# Patient Record
Sex: MALE | Race: BLACK OR AFRICAN AMERICAN | NOT HISPANIC OR LATINO | Employment: FULL TIME | URBAN - METROPOLITAN AREA
[De-identification: names, ages, dates, MRNs, and addresses within clinical notes are randomized per-mention and may not be internally consistent; named-entity substitution may affect disease eponyms.]

---

## 2018-06-05 ENCOUNTER — OFFICE VISIT (OUTPATIENT)
Dept: NEUROLOGY | Facility: CLINIC | Age: 18
End: 2018-06-05
Payer: COMMERCIAL

## 2018-06-05 VITALS
BODY MASS INDEX: 29.98 KG/M2 | WEIGHT: 191 LBS | HEIGHT: 67 IN | HEART RATE: 64 BPM | DIASTOLIC BLOOD PRESSURE: 54 MMHG | SYSTOLIC BLOOD PRESSURE: 112 MMHG

## 2018-06-05 DIAGNOSIS — G40.409 OTHER GENERALIZED EPILEPSY, NOT INTRACTABLE, WITHOUT STATUS EPILEPTICUS: Primary | ICD-10-CM

## 2018-06-05 PROCEDURE — 3008F BODY MASS INDEX DOCD: CPT | Mod: CPTII,S$GLB,, | Performed by: PSYCHIATRY & NEUROLOGY

## 2018-06-05 PROCEDURE — 99999 PR PBB SHADOW E&M-NEW PATIENT-LVL III: CPT | Mod: PBBFAC,,, | Performed by: PSYCHIATRY & NEUROLOGY

## 2018-06-05 PROCEDURE — 99204 OFFICE O/P NEW MOD 45 MIN: CPT | Mod: S$GLB,,, | Performed by: PSYCHIATRY & NEUROLOGY

## 2018-06-05 RX ORDER — DIVALPROEX SODIUM 500 MG/1
500 TABLET, FILM COATED, EXTENDED RELEASE ORAL 2 TIMES DAILY
COMMUNITY
End: 2018-07-10 | Stop reason: SDUPTHER

## 2018-06-05 NOTE — PROGRESS NOTES
[facility] - NEUROLOGY EPILEPSY  [facility] [place]    Date: 6/5/18  Patient Name: Teodora Sanchez   MRN: [id number]   PCP: Verna Slater  Referring Provider: Self, Aaareferral    Assessment:      This is Teodora Sanchez, 17 y.o. male who presents to establish care for a longstanding seizure disorder.  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  Patient has known history of abnormal EEG with 3 hertz spike and wave however reports an ambulatory EEG completed last year with over 100 reported seizures per night (report not available today).  Will repeat ambulatory EMG to assess seizure burden pending results with this as well as trial of good compliance on Depakote, may consider AED changes.  Patient will also undergo baseline seizure protocoled MRI brain.  Will defer Depakote level until patient is compliant with medication.  Plan:      Problem List Items Addressed This Visit        Neuro    Epilepsy - Primary    Relevant Orders    Ambulatory EEG monitoring    MRI Brain Epilepsy Without Contrast           I completed education on seizure first aid and safety. I recommended seizure precautions with regards to avoiding unsupervised water recreational activity or bathing in tubs, climbing or working at heights, operation of heavy or dangerous machinery, caution around fire and sources of high heat, as well as any other activity which could put a patient at danger in case of a seizure.  I also reviewed the [place] DMV law and recommended that the patient not drive until seizure free for a minimum of 6 months.    Charles Woodruff MD  [facility]   Department of Neurology    Patient note was created using Dragon Dictation.  Any errors in syntax or even information may not have been identified and edited on initial review prior to signing this note.  Subjective:       HPI:   Mr. Teodora Sanchez is a 17 y.o. male who presents with a chief complaint of long-standing epilepsy.  Patient  presents today with his mother who contributes to the history.  Together they report that the patient began having seizures around the age of 9 or 10 when he was in 5th to 6th grade.  He initially present absence type seizures described as brief staring spells with rapid return to baseline.  These were followed several years later by onset of generalized tonic-clonic seizures.  He has previously been maintained on Zarontin and Lamictal and most recently has been managed on Depakote but admits that as a new college student, he has had several recent provoke seizures in the setting of alcohol use and sleep deprivation.  His mother states that several years ago he underwent an ambulatory EEG performed by a neurologist in [place] and was told that he had 100s of seizures in his sleep.  The patient does have record of an EEG on file at all sure which showed 3 hertz spike and wave in 2013.  He has never undergone any form of brain imaging that he or his mother are aware of.    Seizure Type: Primary generalized  Seizure Etiology: Unknown  Current AEDs: Depakote 500 mg BID    The patient is accompanied by family who contribute to the history. This patient has 2 types of seizure as described below. The patient reports having seizures for years. The patient reports to have stable seizure control. The seizure frequency is approximately monthly. The last seizure was 6/3/18 . The patient reports no side effects from seizure medication.     Seizure Type 1:   Seizure Description: Blank stare for a few minutes   Aura: No warning  Associated Symptoms: None  Seizure Frequency: Daily  Last seizure: 8th grade    Seizure Type 2:   Seizure Description: hands curl in, jerks all over, eyes flutter, postictal confusions  Aura: None  Associated Symptoms: Bites tongue  Seizure Frequency: Monthly  Last seizure: 6/2/18- had two, came out, started again    Handedness: Right handed  Seizure Triggers/ Provoking Features: alcohol  consumption,  "sleep deprivation, stress and missed medications   Seizure Onset Age: ~5th-6th grade  Seizure/ Epilepsy Risk Factors: Childhood epilepsy, mother with childhood epilepsy  Birth/Developmental History: Was in NICU "tailbone wasn't connected." Normal developmental history.   Previous Seizure Medications: VPA, ZNS, LTG  Other Treatments: None  Episodes of Status: ~Age 13 (Cluster of 8 sz, needed repeated IV meds)  Recent Med Changes: None  Psychiatric/Behavioral Comorbitidies: None  Surgical Candidacy: N/A    Prior Studies:  EEG : 2013- 3 Hz spike and wave   vEEG/ EMU evaluation: Not done  MRI of brain: Not done  Other studies:Not done  AED levels: Not done  CT/CTA Scan: Not done  PET Scan:Not done  Neuropsychological Evaluation: Not done  DEXA Scan: Not done    PAST MEDICAL HISTORY:  Past Medical History:   Diagnosis Date    Asthma     Seizures        PAST SURGICAL HISTORY:  Past Surgical History:   Procedure Laterality Date    CYST REMOVAL      leg     CURRENT MEDS:  Current Outpatient Prescriptions   Medication Sig Dispense Refill    divalproex ER (DEPAKOTE) 500 MG Tb24 Take 500 mg by mouth 2 (two) times daily.      albuterol (ACCUNEB) 1.25 mg/3 mL Nebu Take 1.25 mg by nebulization every 6 (six) hours as needed.       No current facility-administered medications for this visit.      ALLERGIES:  Review of patient's allergies indicates:  No Known Allergies    FAMILY HISTORY:  No family history on file.    SOCIAL HISTORY:  Social History   Substance Use Topics    Smoking status: Never Smoker    Smokeless tobacco: Not on file    Alcohol use No       Review of Systems:  12 review of systems is negative except for the symptoms mentioned in HPI.        Objective:     Vitals:    06/05/18 1612   BP: (!) 112/54   Pulse: 64   Weight: 86.6 kg (191 lb)   Height: 5' 6.5" (1.689 m)       General: NAD, well nourished   Eyes: no tearing, discharge, no erythema   ENT: moist mucous membranes of the oral cavity, nares patent  "   Neck: Supple, Full range of motion  Cardiovascular: Warm and well perfused, pulses equal and symmetrical  Lungs: Normal work of breathing, normal chest wall excursions  Skin: No rash, lesions, or breakdown on exposed skin  Psychiatry: Mood and affect are appropriate   Abdomen: soft, non tender, non distended  Extremeties: No cyanosis, clubbing or edema.    Neurological   MENTAL STATUS: Alert and oriented to person, place, and time. Attention and concentration within normal limits. Speech without dysarthria, able to name and repeat without difficulty. Recent and remote memory within normal limits   CRANIAL NERVES: Visual fields intact. PERRL. EOMI. Facial sensation intact. Face symmetrical. Hearing grossly intact. Full shoulder shrug bilaterally. Tongue protrudes midline   SENSORY: Sensation is intact to light touch throughout.  MOTOR: Normal bulk and tone.   5/5 deltoid, biceps, triceps, interosseous, hand  bilaterally. 5/5 iliopsoas, knee extension/flexion, foot dorsi/plantarflexion bilaterally.    REFLEXES: Symmetric and 2+ throughout.   CEREBELLAR/COORDINATION/GAIT: Gait steady with normal arm swing and stride length.Finger to nose intact. Normal rapid alternating movements.

## 2018-06-06 NOTE — PATIENT INSTRUCTIONS
First Aid: Seizures  A seizure results from a sudden rush of abnormal electrical signals in the brain. Symptoms may range from a minor daze to uncontrollable muscle spasms (convulsions). In many cases, the victim will lose consciousness. A seizure can be caused by a high fever, head injury, drug reaction, or condition such as epilepsy.  1. Protect the head  · Help the victim to the floor if he or she begins losing muscle control. Turn the person on his or her side to prevent choking.  · Protect the victim's head from injury by placing something soft, such as folded clothes, beneath it, and by moving objects away from the victim.  · Don't cause injury by restraining the person or by placing anything in his or her mouth.  · Remove eyeglasses.  2.  Preserve dignity  · Clear away bystanders.  · Reassure the victim, who may be confused, drowsy, or hostile when coming out of the seizure.  · Cover the person or provide dry clothes if muscle spasms have caused a loss of bladder control.  3. Check for injury  · Make sure the victim's mental state has returned to normal. One way to do this is to ask the person his or her name, the year, and your location.  · Injuries can occur to the head, mouth, tongue, or body.   4. Call 911  · If the seizure lasts longer than five minutes (Note: Timing the seizure and recovery time is helpful in many cases.)  · If a second seizure occurs  · If the victim doesnt regain consciousness  · If the victim is pregnant  · If the victim has no history of seizures  · If the person has sustained an injury during the seizure. (Note: If the injury is not severe or life threatening, it may be more appropriate to seek treatment with the primary care provider.)  Date Last Reviewed: 10/19/2015  © 2192-4222 [employer]. [address] [place] [address], [place] [address]. All rights reserved. This information is not intended as a substitute for professional medical care. Always follow your healthcare  professional's instructions.

## 2018-06-11 ENCOUNTER — HOSPITAL ENCOUNTER (OUTPATIENT)
Dept: RADIOLOGY | Facility: HOSPITAL | Age: 18
Discharge: HOME | End: 2018-06-11
Attending: PSYCHIATRY & NEUROLOGY
Payer: COMMERCIAL

## 2018-06-11 DIAGNOSIS — G40.409 OTHER GENERALIZED EPILEPSY, NOT INTRACTABLE, WITHOUT STATUS EPILEPTICUS: ICD-10-CM

## 2018-06-11 PROCEDURE — 70551 MRI BRAIN STEM W/O DYE: CPT | Mod: TC

## 2018-06-11 PROCEDURE — 70551 MRI BRAIN STEM W/O DYE: CPT | Mod: 26,,, | Performed by: RADIOLOGY

## 2018-06-12 ENCOUNTER — HOSPITAL ENCOUNTER (OUTPATIENT)
Dept: NEUROLOGY | Facility: CLINIC | Age: 18
Discharge: HOME | End: 2018-06-12
Payer: COMMERCIAL

## 2018-06-12 DIAGNOSIS — G40.409 OTHER GENERALIZED EPILEPSY, NOT INTRACTABLE, WITHOUT STATUS EPILEPTICUS: ICD-10-CM

## 2018-06-12 PROCEDURE — 95953 PR EEG MONITORING/COMPUTER, EA 24 HOURS, UNATTENDED: CPT | Mod: S$GLB,,, | Performed by: PSYCHIATRY & NEUROLOGY

## 2018-06-12 PROCEDURE — 95953 PR EEG MONITORING/COMPUTER, EA 24 HOURS, UNATTENDED: ICD-10-PCS | Mod: S$GLB,,, | Performed by: PSYCHIATRY & NEUROLOGY

## 2018-06-13 PROCEDURE — 95953 PR EEG MONITORING/COMPUTER, EA 24 HOURS, UNATTENDED: CPT | Mod: S$GLB,,, | Performed by: PSYCHIATRY & NEUROLOGY

## 2018-06-13 PROCEDURE — 95953 PR EEG MONITORING/COMPUTER, EA 24 HOURS, UNATTENDED: ICD-10-PCS | Mod: S$GLB,,, | Performed by: PSYCHIATRY & NEUROLOGY

## 2018-06-14 ENCOUNTER — HOSPITAL ENCOUNTER (OUTPATIENT)
Dept: NEUROLOGY | Facility: CLINIC | Age: 18
Discharge: HOME | End: 2018-06-14
Payer: COMMERCIAL

## 2018-07-10 ENCOUNTER — LAB VISIT (OUTPATIENT)
Dept: LAB | Facility: HOSPITAL | Age: 18
End: 2018-07-10
Attending: PSYCHIATRY & NEUROLOGY
Payer: COMMERCIAL

## 2018-07-10 ENCOUNTER — OFFICE VISIT (OUTPATIENT)
Dept: NEUROLOGY | Facility: CLINIC | Age: 18
End: 2018-07-10
Payer: COMMERCIAL

## 2018-07-10 VITALS — HEIGHT: 66 IN | BODY MASS INDEX: 30.7 KG/M2 | WEIGHT: 191 LBS

## 2018-07-10 DIAGNOSIS — Z51.81 THERAPEUTIC DRUG MONITORING: ICD-10-CM

## 2018-07-10 DIAGNOSIS — Z51.81 THERAPEUTIC DRUG MONITORING: Primary | ICD-10-CM

## 2018-07-10 LAB — VALPROATE SERPL-MCNC: 80.9 UG/ML

## 2018-07-10 PROCEDURE — 80164 ASSAY DIPROPYLACETIC ACD TOT: CPT

## 2018-07-10 PROCEDURE — 3008F BODY MASS INDEX DOCD: CPT | Mod: CPTII,S$GLB,, | Performed by: PSYCHIATRY & NEUROLOGY

## 2018-07-10 PROCEDURE — 36415 COLL VENOUS BLD VENIPUNCTURE: CPT

## 2018-07-10 PROCEDURE — 99214 OFFICE O/P EST MOD 30 MIN: CPT | Mod: S$GLB,,, | Performed by: PSYCHIATRY & NEUROLOGY

## 2018-07-10 PROCEDURE — 99999 PR PBB SHADOW E&M-EST. PATIENT-LVL III: CPT | Mod: PBBFAC,,, | Performed by: PSYCHIATRY & NEUROLOGY

## 2018-07-10 RX ORDER — DIVALPROEX SODIUM 500 MG/1
500 TABLET, FILM COATED, EXTENDED RELEASE ORAL 2 TIMES DAILY
Qty: 180 TABLET | Refills: 3 | Status: SHIPPED | OUTPATIENT
Start: 2018-07-10 | End: 2018-09-07

## 2018-07-10 NOTE — PATIENT INSTRUCTIONS
First Aid: Seizures  A seizure results from a sudden rush of abnormal electrical signals in the brain. Symptoms may range from a minor daze to uncontrollable muscle spasms (convulsions). In many cases, the victim will lose consciousness. A seizure can be caused by a high fever, head injury, drug reaction, or condition such as epilepsy.  1. Protect the head  · Help the victim to the floor if he or she begins losing muscle control. Turn the person on his or her side to prevent choking.  · Protect the victim's head from injury by placing something soft, such as folded clothes, beneath it, and by moving objects away from the victim.  · Don't cause injury by restraining the person or by placing anything in his or her mouth.  · Remove eyeglasses.  2.  Preserve dignity  · Clear away bystanders.  · Reassure the victim, who may be confused, drowsy, or hostile when coming out of the seizure.  · Cover the person or provide dry clothes if muscle spasms have caused a loss of bladder control.  3. Check for injury  · Make sure the victim's mental state has returned to normal. One way to do this is to ask the person his or her name, the year, and your location.  · Injuries can occur to the head, mouth, tongue, or body.   4. Call 911  · If the seizure lasts longer than five minutes (Note: Timing the seizure and recovery time is helpful in many cases.)  · If a second seizure occurs  · If the victim doesnt regain consciousness  · If the victim is pregnant  · If the victim has no history of seizures  · If the person has sustained an injury during the seizure. (Note: If the injury is not severe or life threatening, it may be more appropriate to seek treatment with the primary care provider.)  Date Last Reviewed: 10/19/2015  © 0524-2776 [employer]. [address] [place] [address], [place] [address]. All rights reserved. This information is not intended as a substitute for professional medical care. Always follow your healthcare  professional's instructions.

## 2018-07-10 NOTE — PROGRESS NOTES
[facility] - NEUROLOGY EPILEPSY  [facility] [place]    Date: 7/10/18  Patient Name: Teodora Sanchez   MRN: [id number]   PCP: Verna Slater  Referring Provider: No ref. provider found    Assessment:      This is Teodora Sanchez, 17 y.o. male who presents to establish care for a longstanding seizure disorder.  The patient is now compliant with medication with no breakthrough seizures since becoming complaint. Will obtain baseline VPA level today. Have reviewed MRI brain and Ambulatory EEG as discussed below.  Plan:      Problem List Items Addressed This Visit     None      Visit Diagnoses     Therapeutic drug monitoring    -  Primary    Relevant Orders    Valproic Acid           I completed education on seizure first aid and safety. I recommended seizure precautions with regards to avoiding unsupervised water recreational activity or bathing in tubs, climbing or working at heights, operation of heavy or dangerous machinery, caution around fire and sources of high heat, as well as any other activity which could put a patient at danger in case of a seizure.  I also reviewed the [place] DMV law and recommended that the patient not drive until seizure free for a minimum of 6 months.    Charles Woodruff MD  [facility]   Department of Neurology    Patient note was created using Dragon Dictation.  Any errors in syntax or even information may not have been identified and edited on initial review prior to signing this note.  Subjective:       HPI:   Mr. Teodora Sanchez is a 17 y.o. male who presents in follow up for long-standing epilepsy. The patient reports that he has been compliant with his depakote since his last visit and reports only a single breakthrough seizure recorded during his ambulatory EEG which also revealed 3 Hz spike/wave discharges. 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 He denies side effects and has no new complaints.     Seizure  "Type: Primary generalized  Seizure Etiology: Unknown  Current AEDs: Depakote 500 mg BID    The patient is accompanied by family who contribute to the history. This patient has 2 types of seizure as described below. The patient reports having seizures for years. The patient reports to have stable seizure control. The seizure frequency is approximately monthly. The last seizure was 6/12/18 . The patient reports no side effects from seizure medication.     Seizure Type 1:   Seizure Description: Blank stare for a few minutes   Aura: No warning  Associated Symptoms: None  Seizure Frequency: Daily  Last seizure: 8th grade    Seizure Type 2:   Seizure Description: hands curl in, jerks all over, eyes flutter, postictal confusions  Aura: None  Associated Symptoms: Bites tongue  Seizure Frequency: Monthly  Last seizure: 6/12/18    Handedness: Right handed  Seizure Triggers/ Provoking Features: alcohol  consumption, sleep deprivation, stress and missed medications   Seizure Onset Age: ~5th-6th grade  Seizure/ Epilepsy Risk Factors: Childhood epilepsy, mother with childhood epilepsy  Birth/Developmental History: Was in NICU "tailbone wasn't connected." Normal developmental history.   Previous Seizure Medications: VPA, ZNS, LTG  Other Treatments: None  Episodes of Status: ~Age 13 (Cluster of 8 sz, needed repeated IV meds)  Recent Med Changes: None  Psychiatric/Behavioral Comorbitidies: None  Surgical Candidacy: N/A    Prior Studies:  EEG : 2013- 3 Hz spike and wave, 6/2018- Ambulatory Same with one generalized sz captured  vEEG/ EMU evaluation: Not done  MRI of brain: L temporal MTS  Other studies:Not done  AED levels: Not done  CT/CTA Scan: Not done  PET Scan:Not done  Neuropsychological Evaluation: Not done  DEXA Scan: Not done    PAST MEDICAL HISTORY:  Past Medical History:   Diagnosis Date    Asthma     Seizures        PAST SURGICAL HISTORY:  Past Surgical History:   Procedure Laterality Date    CYST REMOVAL      leg " "    CURRENT MEDS:  Current Outpatient Prescriptions   Medication Sig Dispense Refill    albuterol (ACCUNEB) 1.25 mg/3 mL Nebu Take 1.25 mg by nebulization every 6 (six) hours as needed.      divalproex ER (DEPAKOTE) 500 MG Tb24 Take 1 tablet (500 mg total) by mouth 2 (two) times daily. 180 tablet 3     No current facility-administered medications for this visit.      ALLERGIES:  Review of patient's allergies indicates:  No Known Allergies    FAMILY HISTORY:  History reviewed. No pertinent family history.    SOCIAL HISTORY:  Social History   Substance Use Topics    Smoking status: Never Smoker    Smokeless tobacco: Not on file    Alcohol use No       Review of Systems:  12 review of systems is negative except for the symptoms mentioned in HPI.        Objective:     Vitals:    07/10/18 1055   Weight: 86.6 kg (191 lb)   Height: 5' 6" (1.676 m)       General: NAD, well nourished   Eyes: no tearing, discharge, no erythema   ENT: moist mucous membranes of the oral cavity, nares patent    Neck: Supple, Full range of motion  Cardiovascular: Warm and well perfused, pulses equal and symmetrical  Lungs: Normal work of breathing, normal chest wall excursions  Skin: No rash, lesions, or breakdown on exposed skin  Psychiatry: Mood and affect are appropriate   Abdomen: soft, non tender, non distended  Extremeties: No cyanosis, clubbing or edema.    Neurological   MENTAL STATUS: Alert and oriented to person, place, and time. Attention and concentration within normal limits. Speech without dysarthria, able to name and repeat without difficulty. Recent and remote memory within normal limits   CRANIAL NERVES: Visual fields intact. PERRL. EOMI. Facial sensation intact. Face symmetrical. Hearing grossly intact. Full shoulder shrug bilaterally. Tongue protrudes midline   SENSORY: Sensation is intact to light touch throughout.  MOTOR: Normal bulk and tone.   5/5 deltoid, biceps, triceps, interosseous, hand  bilaterally. 5/5 " iliopsoas, knee extension/flexion, foot dorsi/plantarflexion bilaterally.    REFLEXES: Symmetric and 2+ throughout.   CEREBELLAR/COORDINATION/GAIT: Gait steady with normal arm swing and stride length.Finger to nose intact.

## 2018-07-25 ENCOUNTER — LAB VISIT (OUTPATIENT)
Dept: LAB | Facility: HOSPITAL | Age: 18
End: 2018-07-25
Attending: PSYCHIATRY & NEUROLOGY
Payer: COMMERCIAL

## 2018-07-25 ENCOUNTER — TELEPHONE (OUTPATIENT)
Dept: NEUROLOGY | Facility: CLINIC | Age: 18
End: 2018-07-25

## 2018-07-25 DIAGNOSIS — G40.919 BREAKTHROUGH SEIZURE: Primary | ICD-10-CM

## 2018-07-25 DIAGNOSIS — Z00.00 PREVENTATIVE HEALTH CARE: ICD-10-CM

## 2018-07-25 DIAGNOSIS — G40.919 BREAKTHROUGH SEIZURE: ICD-10-CM

## 2018-07-25 LAB — VALPROATE SERPL-MCNC: 52.5 UG/ML

## 2018-07-25 PROCEDURE — 80164 ASSAY DIPROPYLACETIC ACD TOT: CPT

## 2018-07-25 PROCEDURE — 36415 COLL VENOUS BLD VENIPUNCTURE: CPT

## 2018-07-25 NOTE — TELEPHONE ENCOUNTER
"Returned call to pt's mother who states Dr. Woodruff wanted to know if Teodora had had any seizures.  Pt had one last night but never gained consciousness before falling asleep, I am unsure of when it started.  "it scared me to death".  He is complaining of a headache and shoulder pain today. Had him checked today and has no injury.  Let her know I will notify Dr. Woodruff   "

## 2018-08-08 ENCOUNTER — HOSPITAL ENCOUNTER (EMERGENCY)
Facility: HOSPITAL | Age: 18
Discharge: HOME | End: 2018-08-08
Attending: EMERGENCY MEDICINE
Payer: COMMERCIAL

## 2018-08-08 VITALS
HEART RATE: 66 BPM | SYSTOLIC BLOOD PRESSURE: 112 MMHG | BODY MASS INDEX: 29.82 KG/M2 | OXYGEN SATURATION: 99 % | RESPIRATION RATE: 16 BRPM | WEIGHT: 190 LBS | TEMPERATURE: 98 F | DIASTOLIC BLOOD PRESSURE: 56 MMHG | HEIGHT: 67 IN

## 2018-08-08 DIAGNOSIS — R56.9 SEIZURE: Primary | ICD-10-CM

## 2018-08-08 LAB
ALBUMIN SERPL BCP-MCNC: 4.1 G/DL
ALP SERPL-CCNC: 86 U/L
ALT SERPL W/O P-5'-P-CCNC: 27 U/L
ANION GAP SERPL CALC-SCNC: 12 MMOL/L
AST SERPL-CCNC: 23 U/L
BACTERIA #/AREA URNS HPF: ABNORMAL /HPF
BASOPHILS # BLD AUTO: 0.01 K/UL
BASOPHILS NFR BLD: 0.2 %
BILIRUB SERPL-MCNC: 0.4 MG/DL
BILIRUB UR QL STRIP: NEGATIVE
BUN SERPL-MCNC: 10 MG/DL
CALCIUM SERPL-MCNC: 9.8 MG/DL
CHLORIDE SERPL-SCNC: 103 MMOL/L
CLARITY UR: CLEAR
CO2 SERPL-SCNC: 25 MMOL/L
COLOR UR: YELLOW
CREAT SERPL-MCNC: 0.9 MG/DL
DIFFERENTIAL METHOD: ABNORMAL
EOSINOPHIL # BLD AUTO: 0 K/UL
EOSINOPHIL NFR BLD: 0.7 %
ERYTHROCYTE [DISTWIDTH] IN BLOOD BY AUTOMATED COUNT: 12.6 %
EST. GFR  (AFRICAN AMERICAN): >60 ML/MIN/1.73 M^2
EST. GFR  (NON AFRICAN AMERICAN): >60 ML/MIN/1.73 M^2
GLUCOSE SERPL-MCNC: 94 MG/DL
GLUCOSE UR QL STRIP: NEGATIVE
HCT VFR BLD AUTO: 37.7 %
HGB BLD-MCNC: 13.1 G/DL
HGB UR QL STRIP: NEGATIVE
HYALINE CASTS #/AREA URNS LPF: 5 /LPF
KETONES UR QL STRIP: NEGATIVE
LEUKOCYTE ESTERASE UR QL STRIP: NEGATIVE
LYMPHOCYTES # BLD AUTO: 1.7 K/UL
LYMPHOCYTES NFR BLD: 32.3 %
MCH RBC QN AUTO: 30 PG
MCHC RBC AUTO-ENTMCNC: 34.7 G/DL
MCV RBC AUTO: 87 FL
MICROSCOPIC COMMENT: ABNORMAL
MONOCYTES # BLD AUTO: 0.6 K/UL
MONOCYTES NFR BLD: 11.4 %
NEUTROPHILS # BLD AUTO: 3 K/UL
NEUTROPHILS NFR BLD: 55.4 %
NITRITE UR QL STRIP: NEGATIVE
PH UR STRIP: 5 [PH] (ref 5–8)
PLATELET # BLD AUTO: 238 K/UL
PMV BLD AUTO: 10.5 FL
POTASSIUM SERPL-SCNC: 4.2 MMOL/L
PROT SERPL-MCNC: 8 G/DL
PROT UR QL STRIP: ABNORMAL
RBC # BLD AUTO: 4.36 M/UL
RBC #/AREA URNS HPF: 1 /HPF (ref 0–4)
SODIUM SERPL-SCNC: 140 MMOL/L
SP GR UR STRIP: 1.02 (ref 1–1.03)
SQUAMOUS #/AREA URNS HPF: 2 /HPF
URN SPEC COLLECT METH UR: ABNORMAL
UROBILINOGEN UR STRIP-ACNC: NEGATIVE EU/DL
VALPROATE SERPL-MCNC: 28.2 UG/ML
WBC # BLD AUTO: 5.36 K/UL
WBC #/AREA URNS HPF: 1 /HPF (ref 0–5)

## 2018-08-08 PROCEDURE — 85025 COMPLETE CBC W/AUTO DIFF WBC: CPT

## 2018-08-08 PROCEDURE — 80053 COMPREHEN METABOLIC PANEL: CPT

## 2018-08-08 PROCEDURE — 80164 ASSAY DIPROPYLACETIC ACD TOT: CPT

## 2018-08-08 PROCEDURE — 25000003 PHARM REV CODE 250: Performed by: EMERGENCY MEDICINE

## 2018-08-08 PROCEDURE — 81000 URINALYSIS NONAUTO W/SCOPE: CPT

## 2018-08-08 PROCEDURE — 99284 EMERGENCY DEPT VISIT MOD MDM: CPT

## 2018-08-08 RX ORDER — DIVALPROEX SODIUM 250 MG/1
500 TABLET, DELAYED RELEASE ORAL
Status: COMPLETED | OUTPATIENT
Start: 2018-08-08 | End: 2018-08-08

## 2018-08-08 RX ORDER — LORAZEPAM 0.5 MG/1
1 TABLET ORAL
Status: COMPLETED | OUTPATIENT
Start: 2018-08-08 | End: 2018-08-08

## 2018-08-08 RX ADMIN — LORAZEPAM 1 MG: 0.5 TABLET ORAL at 03:08

## 2018-08-08 RX ADMIN — DIVALPROEX SODIUM 500 MG: 250 TABLET, DELAYED RELEASE ORAL at 03:08

## 2018-08-08 NOTE — ED PROVIDER NOTES
Encounter Date: 8/8/2018    SCRIBE #1 NOTE: I, Luana Pulido, am scribing for, and in the presence of,  Henrique Randhawa MD. I have scribed the following portions of the note - Other sections scribed: HPI and ROS.       History     Chief Complaint   Patient presents with    Seizures     witnessed sz activity at work.  poss missed meds yesterday.       Chief Complaint: Seizures    HPI: This 17 y.o.male with asthma and seizures presents to the ED secondary to a seizure. Episode occurred while at work just [employer]. Patient is not sure how long episode last before coming to. He suspects episode was witnessed by co-worker. Patient missed seizure medication dose on yesterday. He is asymptomatic at this time. No fever, chills, headache, nausea or vomiting.       The history is provided by the patient. No  was used.     Review of patient's allergies indicates:  No Known Allergies  Past Medical History:   Diagnosis Date    Asthma     Seizures      Past Surgical History:   Procedure Laterality Date    CYST REMOVAL      leg     History reviewed. No pertinent family history.  Social History   Substance Use Topics    Smoking status: Never Smoker    Smokeless tobacco: Not on file    Alcohol use No     Review of Systems   Constitutional: Negative for chills and fever.   HENT: Negative for ear pain and sore throat.    Eyes: Negative for pain.   Respiratory: Negative for cough and shortness of breath.    Cardiovascular: Negative for chest pain.   Gastrointestinal: Negative for abdominal pain, diarrhea, nausea and vomiting.   Genitourinary: Negative for dysuria.   Musculoskeletal: Negative for myalgias (arm or leg pain).   Skin: Negative for rash.   Neurological: Negative for headaches.       Physical Exam     Initial Vitals [08/08/18 1342]   BP Pulse Resp Temp SpO2   135/75 81 16 98.3 °F (36.8 °C) 98 %      MAP       --         Physical Exam    Nursing note and vitals reviewed.  Constitutional: He appears  well-developed and well-nourished.   HENT:   Head: Normocephalic and atraumatic.   Eyes: Conjunctivae and EOM are normal.   Neck: Normal range of motion.   Cardiovascular: Normal rate and regular rhythm.   Pulmonary/Chest: Breath sounds normal. No stridor. No respiratory distress. He has no wheezes.   Abdominal: Soft. Bowel sounds are normal. He exhibits no distension.   Musculoskeletal: Normal range of motion. He exhibits no edema or tenderness.   Neurological: He is oriented to person, place, and time. He has normal strength. GCS score is 15. GCS eye subscore is 4. GCS verbal subscore is 5. GCS motor subscore is 6.   Skin: Skin is warm and dry. Capillary refill takes less than 2 seconds.   Psychiatric: He has a normal mood and affect. Thought content normal.         ED Course   Procedures  Labs Reviewed   CBC W/ AUTO DIFFERENTIAL - Abnormal; Notable for the following:        Result Value    RBC 4.36 (*)     Hemoglobin 13.1 (*)     Hematocrit 37.7 (*)     All other components within normal limits   URINALYSIS, REFLEX TO URINE CULTURE - Abnormal; Notable for the following:     Protein, UA 1+ (*)     All other components within normal limits    Narrative:     Preferred Collection Type->Urine, Clean Catch   VALPROIC ACID - Abnormal; Notable for the following:     Valproic Acid Lvl 28.2 (*)     All other components within normal limits   URINALYSIS MICROSCOPIC - Abnormal; Notable for the following:     Hyaline Casts, UA 5 (*)     All other components within normal limits    Narrative:     Preferred Collection Type->Urine, Clean Catch   COMPREHENSIVE METABOLIC PANEL          Imaging Results    None          Medical Decision Making:   Differential Diagnosis:   Patient with PMH significant for seizure disorder presented to ED today following a seizure.    Pt is not compliant with AED therapy. Supposed to be taking depakote  Workup today included labs, observation which was unremarkable other than low valproic acid  level  I have loaded the patient on appropriate AED treatment and gave oral ativan to stabilize as depakote gets absorved. Took his morning dose.     Pt was observed in the ED for several hours with no recurrent seizure episodes. Back to neurologic baseline, stable steady gait. Advised on importance of adherence with medical regimen, and told to f/u with his neurologist for close followup and advised no driving for 9 months per [place] law. Pt verbalized their understanding of the treatment plan and this law back to me. They were advised to return to the ED for any new or worsening symptoms. They were given the opportunity to ask questions, which were reasonably addressed to the best of my ability and their apparent satisfaction. Mother to take patient home    Henrique Randhawa      Clinical Tests:   Lab Tests: Ordered and Reviewed            Scribe Attestation:   Scribe #1: I performed the above scribed service and the documentation accurately describes the services I performed. I attest to the accuracy of the note.    Attending Attestation:           Physician Attestation for Scribe:  Physician Attestation Statement for Scribe #1: I, Henrique Randhawa MD, reviewed documentation, as scribed by Luana Pulido in my presence, and it is both accurate and complete.                    Clinical Impression:   The encounter diagnosis was Seizure.                             Henrique Randhawa MD  08/08/18 1342       Henrique Randhawa MD  11/01/18 1411

## 2018-08-08 NOTE — ED TRIAGE NOTES
Pt arrived to the ED due to a seizure that happened earlier today. Pt has a hx of seizures. Pt states that he takes his medication 2 times a day and he missed his seizure medication dose last night. Pt's last seizure was 3 weeks ago and stated that he missed his seizure medication dose before he had that seizure also. Pt is aware that he fell standing up but isn't positive if he injured his head. Pt does report that his right shoulder hurts

## 2018-09-05 ENCOUNTER — HOSPITAL ENCOUNTER (EMERGENCY)
Facility: HOSPITAL | Age: 18
Discharge: HOME | End: 2018-09-06
Attending: EMERGENCY MEDICINE
Payer: COMMERCIAL

## 2018-09-05 DIAGNOSIS — G40.919 BREAKTHROUGH SEIZURE: Primary | ICD-10-CM

## 2018-09-05 LAB
ALBUMIN SERPL BCP-MCNC: 4.5 G/DL
ALP SERPL-CCNC: 87 U/L
ALT SERPL W/O P-5'-P-CCNC: 42 U/L
ANION GAP SERPL CALC-SCNC: 25 MMOL/L
AST SERPL-CCNC: 32 U/L
BACTERIA #/AREA URNS HPF: NORMAL /HPF
BASOPHILS # BLD AUTO: 0.02 K/UL
BASOPHILS NFR BLD: 0.2 %
BILIRUB SERPL-MCNC: 0.5 MG/DL
BILIRUB UR QL STRIP: NEGATIVE
BUN SERPL-MCNC: 9 MG/DL
CALCIUM SERPL-MCNC: 9.6 MG/DL
CHLORIDE SERPL-SCNC: 103 MMOL/L
CLARITY UR: CLEAR
CO2 SERPL-SCNC: 11 MMOL/L
COLOR UR: YELLOW
CREAT SERPL-MCNC: 1.1 MG/DL
DIFFERENTIAL METHOD: ABNORMAL
EOSINOPHIL # BLD AUTO: 0.1 K/UL
EOSINOPHIL NFR BLD: 1 %
ERYTHROCYTE [DISTWIDTH] IN BLOOD BY AUTOMATED COUNT: 12.9 %
EST. GFR  (AFRICAN AMERICAN): >60 ML/MIN/1.73 M^2
EST. GFR  (NON AFRICAN AMERICAN): >60 ML/MIN/1.73 M^2
GLUCOSE SERPL-MCNC: 132 MG/DL
GLUCOSE UR QL STRIP: NEGATIVE
HCT VFR BLD AUTO: 42.1 %
HGB BLD-MCNC: 14.2 G/DL
HGB UR QL STRIP: ABNORMAL
HYALINE CASTS #/AREA URNS LPF: NORMAL /LPF
KETONES UR QL STRIP: ABNORMAL
LEUKOCYTE ESTERASE UR QL STRIP: NEGATIVE
LYMPHOCYTES # BLD AUTO: 4 K/UL
LYMPHOCYTES NFR BLD: 45.2 %
MCH RBC QN AUTO: 29.5 PG
MCHC RBC AUTO-ENTMCNC: 33.7 G/DL
MCV RBC AUTO: 87 FL
MICROSCOPIC COMMENT: NORMAL
MONOCYTES # BLD AUTO: 1.1 K/UL
MONOCYTES NFR BLD: 12.1 %
NEUTROPHILS # BLD AUTO: 3.6 K/UL
NEUTROPHILS NFR BLD: 40.4 %
NITRITE UR QL STRIP: NEGATIVE
PH UR STRIP: 5 [PH] (ref 5–8)
PLATELET # BLD AUTO: 306 K/UL
PMV BLD AUTO: 10.7 FL
POTASSIUM SERPL-SCNC: 4.1 MMOL/L
PROT SERPL-MCNC: 8.9 G/DL
PROT UR QL STRIP: ABNORMAL
RBC # BLD AUTO: 4.82 M/UL
RBC #/AREA URNS HPF: 1 /HPF (ref 0–4)
SODIUM SERPL-SCNC: 139 MMOL/L
SP GR UR STRIP: 1.02 (ref 1–1.03)
SQUAMOUS #/AREA URNS HPF: 2 /HPF
URN SPEC COLLECT METH UR: ABNORMAL
UROBILINOGEN UR STRIP-ACNC: NEGATIVE EU/DL
VALPROATE SERPL-MCNC: 41.4 UG/ML
WBC # BLD AUTO: 8.87 K/UL
WBC #/AREA URNS HPF: 2 /HPF (ref 0–5)

## 2018-09-05 PROCEDURE — 80164 ASSAY DIPROPYLACETIC ACD TOT: CPT

## 2018-09-05 PROCEDURE — 85025 COMPLETE CBC W/AUTO DIFF WBC: CPT

## 2018-09-05 PROCEDURE — 25000003 PHARM REV CODE 250: Performed by: EMERGENCY MEDICINE

## 2018-09-05 PROCEDURE — 96374 THER/PROPH/DIAG INJ IV PUSH: CPT

## 2018-09-05 PROCEDURE — 80053 COMPREHEN METABOLIC PANEL: CPT

## 2018-09-05 PROCEDURE — 99284 EMERGENCY DEPT VISIT MOD MDM: CPT | Mod: 25

## 2018-09-05 PROCEDURE — 63600175 PHARM REV CODE 636 W HCPCS: Performed by: EMERGENCY MEDICINE

## 2018-09-05 PROCEDURE — 81000 URINALYSIS NONAUTO W/SCOPE: CPT

## 2018-09-05 RX ORDER — IBUPROFEN 600 MG/1
600 TABLET ORAL
Status: COMPLETED | OUTPATIENT
Start: 2018-09-06 | End: 2018-09-06

## 2018-09-05 RX ORDER — ACETAMINOPHEN 325 MG/1
650 TABLET ORAL
Status: DISCONTINUED | OUTPATIENT
Start: 2018-09-05 | End: 2018-09-06 | Stop reason: HOSPADM

## 2018-09-05 RX ORDER — LORAZEPAM 2 MG/ML
1 INJECTION INTRAMUSCULAR
Status: COMPLETED | OUTPATIENT
Start: 2018-09-05 | End: 2018-09-05

## 2018-09-05 RX ORDER — ACETAMINOPHEN 325 MG/1
650 TABLET ORAL
Status: COMPLETED | OUTPATIENT
Start: 2018-09-05 | End: 2018-09-05

## 2018-09-05 RX ADMIN — ACETAMINOPHEN 650 MG: 325 TABLET, FILM COATED ORAL at 10:09

## 2018-09-05 RX ADMIN — LORAZEPAM 1 MG: 2 INJECTION INTRAMUSCULAR; INTRAVENOUS at 09:09

## 2018-09-06 VITALS
DIASTOLIC BLOOD PRESSURE: 58 MMHG | HEART RATE: 60 BPM | WEIGHT: 196 LBS | RESPIRATION RATE: 18 BRPM | TEMPERATURE: 99 F | BODY MASS INDEX: 29.7 KG/M2 | HEIGHT: 68 IN | SYSTOLIC BLOOD PRESSURE: 102 MMHG | OXYGEN SATURATION: 100 %

## 2018-09-06 PROCEDURE — 25000003 PHARM REV CODE 250: Performed by: EMERGENCY MEDICINE

## 2018-09-06 RX ORDER — DIVALPROEX SODIUM 500 MG/1
500 TABLET, FILM COATED, EXTENDED RELEASE ORAL
Status: COMPLETED | OUTPATIENT
Start: 2018-09-06 | End: 2018-09-06

## 2018-09-06 RX ORDER — DIVALPROEX SODIUM 500 MG/1
500 TABLET, FILM COATED, EXTENDED RELEASE ORAL DAILY
Status: DISCONTINUED | OUTPATIENT
Start: 2018-09-06 | End: 2018-09-06

## 2018-09-06 RX ADMIN — DIVALPROEX SODIUM 500 MG: 500 TABLET, FILM COATED, EXTENDED RELEASE ORAL at 01:09

## 2018-09-06 RX ADMIN — IBUPROFEN 600 MG: 600 TABLET, FILM COATED ORAL at 12:09

## 2018-09-06 NOTE — ED NOTES
Notified Dr Arguello that per mother patient just had another seizure. MD stated that he will ordered another 1mg of ativan.

## 2018-09-06 NOTE — ED TRIAGE NOTES
Pt arrives to er via ems with aunt. Pt aaox4 states had a seizure at work 1830, fell to floor unsure if hit head. Obtain abrasion to left hand from falling. States had last seizure a couple weeks ago. Ha 6/10, left shoulder 10/10 ache.

## 2018-09-06 NOTE — ED PROVIDER NOTES
Encounter Date: 9/5/2018    SCRIBE #1 NOTE: I, Dax Akins, am scribing for, and in the presence of,  Dennis Arguello MD. I have scribed the following portions of the note - Other sections scribed: HPI, ROS and PE.       History     Chief Complaint   Patient presents with    Seizures     Pt with hx of seizures reports seizure at work. Pt is compliant with meds. Pt c/o shoulder pain and has small abrasion on L hand. Pt is AAO     CC: Seizures    HPI: This 17 y.o M with Asthma and Seizures presents to the ED c/o a seizure which occurred today while at work 1830h. Currently, the pt c/o a moderate (5/10) generalized headache and severe (10/10) left shoulder pain. He states that he did fall to the ground, but is unsure of head trauma. He denies LOC. The pt states he is compliant with his Depakote. The pt works at a shipment plant. The pt was last seen in this ED 8/8/18 for a seizure while at work. He denies marijuana use. He denies fever, chills, diaphoresis, nausea, emesis, diarrhea, chest pain, back pain, abdominal pain, dysuria, difficulty urinating and rash.      Neurologist- Charles Woodruff MD      The history is provided by the patient. No  was used.     Review of patient's allergies indicates:  No Known Allergies  Past Medical History:   Diagnosis Date    Asthma     Seizures      Past Surgical History:   Procedure Laterality Date    CYST REMOVAL      leg     History reviewed. No pertinent family history.  Social History     Tobacco Use    Smoking status: Never Smoker    Smokeless tobacco: Never Used   Substance Use Topics    Alcohol use: No    Drug use: No     Review of Systems   Constitutional: Negative for chills and fever.   HENT: Negative for rhinorrhea and sore throat.    Eyes: Negative for redness.   Respiratory: Negative for cough and shortness of breath.    Cardiovascular: Negative for chest pain.   Gastrointestinal: Negative for abdominal pain, diarrhea, nausea and vomiting.    Genitourinary: Negative for dysuria.   Musculoskeletal: Negative for back pain.        (+) left shoulder pain   Skin: Negative for color change.   Neurological: Positive for seizures and headaches. Negative for syncope and weakness.   Psychiatric/Behavioral: The patient is not nervous/anxious.    All other systems reviewed and are negative.      Physical Exam     Initial Vitals [09/05/18 2014]   BP Pulse Resp Temp SpO2   (!) 146/67 85 16 98.4 °F (36.9 °C) 98 %      MAP       --         Physical Exam    Nursing note and vitals reviewed.  Constitutional: He appears well-developed and well-nourished. He is not diaphoretic. No distress.   HENT:   Head: Normocephalic and atraumatic.   Eyes: Conjunctivae and EOM are normal. Pupils are equal, round, and reactive to light. No scleral icterus.   Neck: Normal range of motion. Neck supple.   Cardiovascular: Normal rate, regular rhythm, normal heart sounds and intact distal pulses. Exam reveals no gallop and no friction rub.    No murmur heard.  Pulmonary/Chest: Breath sounds normal. No stridor. No respiratory distress. He has no wheezes. He has no rhonchi. He has no rales.   Abdominal: Soft. Bowel sounds are normal. He exhibits no distension. There is no tenderness. There is no rebound and no guarding.   Musculoskeletal: Normal range of motion. He exhibits no edema or tenderness.   Neurological: He is alert and oriented to person, place, and time. He has normal strength. No cranial nerve deficit.   Skin: Skin is warm and dry. No rash noted.   Psychiatric: He has a normal mood and affect. His behavior is normal.         ED Course   Procedures  Labs Reviewed   CBC W/ AUTO DIFFERENTIAL - Abnormal; Notable for the following components:       Result Value    Mono # 1.1 (*)     All other components within normal limits   COMPREHENSIVE METABOLIC PANEL - Abnormal; Notable for the following components:    CO2 11 (*)     Glucose 132 (*)     Total Protein 8.9 (*)     Anion Gap 25 (*)      All other components within normal limits   URINALYSIS, REFLEX TO URINE CULTURE - Abnormal; Notable for the following components:    Protein, UA 1+ (*)     Ketones, UA Trace (*)     Occult Blood UA 1+ (*)     All other components within normal limits    Narrative:     Preferred Collection Type->Urine, Clean Catch   VALPROIC ACID - Abnormal; Notable for the following components:    Valproic Acid Lvl 41.4 (*)     All other components within normal limits   URINALYSIS MICROSCOPIC    Narrative:     Preferred Collection Type->Urine, Clean Catch            Imaging Results    None          Medical Decision Making:   History:   Old Medical Records: I decided to obtain old medical records.  Clinical Tests:   Lab Tests: Ordered and Reviewed  ED Management:  1900 - Called to bedside by nursing staff. Family reports that patient had a seizure. Patient has his eyes open and follows commands but appears drowsy and is confused. Will administer dose of IV Ativan.    2345 -   The patient is asleep.  He awakens to light tactile stimulation.  He is still complaining of diffuse headache despite receiving Tylenol.    01:00 - The patient is asleep. Awakens to light tactile stimulation. HA resolved. No other complaints. No focal neurological deficits.  Medical decision making:  This was an emergent evaluation of this patient presenting  Following a witnessed generalized tonic-clonic seizure at home.  The patient has a history of seizure disorder.  He was awake and alert on arrival to the emergency department and without focal neurological deficits.  Shortly after arrival, however, he had another witnessed seizure by family.  A dose of IV Ativan was given at that point.  The patient is afebrile.  He has no clinical signs of dehydration.  He complained of headache.  His headache resolved with ED management.  Acute imaging of the brain was not felt to be indicated given the patient's history.  His last seizure occurred 1 month ago.  He  takes Depakote for seizure prophylaxis.  He reports full compliance with his medication but his parents were skeptical it feel that he might be missing doses. He has no leukocytosis. Serum bicarb is low but this is felt to be due to acute seizure. Serum depakote level is slightly subtherapeutic. I feel that the patient is stable for discharge at this time. I will administer an additional dose of Depakote prior to discharge. He and his parents have been instructed to call and arrange follow up with his neurologist later today.            Scribe Attestation:   Scribe #1: I performed the above scribed service and the documentation accurately describes the services I performed. I attest to the accuracy of the note.    Attending Attestation:           Physician Attestation for Scribe:  Physician Attestation Statement for Scribe #1: I, Dennis Arguello MD, reviewed documentation, as scribed by Dax Akins in my presence, and it is both accurate and complete.                 ED Course as of Sep 05 2351   Wed Sep 05, 2018   2340 Valproic Acid Lvl: (!) 41.4 [LP]   2341 Valproic Acid Lvl: (!) 41.4 [LP]      ED Course User Index  [LP] Dennis Arguello III, MD     Clinical Impression:   The encounter diagnosis was Breakthrough seizure.      Disposition:   Disposition: Discharged  Condition: Stable                        Dennis Arguello III, MD  09/06/18 0115

## 2018-09-07 ENCOUNTER — OFFICE VISIT (OUTPATIENT)
Dept: NEUROLOGY | Facility: CLINIC | Age: 18
End: 2018-09-07
Payer: COMMERCIAL

## 2018-09-07 VITALS
HEIGHT: 68 IN | WEIGHT: 196 LBS | SYSTOLIC BLOOD PRESSURE: 118 MMHG | BODY MASS INDEX: 29.7 KG/M2 | DIASTOLIC BLOOD PRESSURE: 74 MMHG | HEART RATE: 84 BPM

## 2018-09-07 DIAGNOSIS — G40.909 NONINTRACTABLE EPILEPSY WITHOUT STATUS EPILEPTICUS, UNSPECIFIED EPILEPSY TYPE: ICD-10-CM

## 2018-09-07 PROCEDURE — 99214 OFFICE O/P EST MOD 30 MIN: CPT | Mod: S$GLB,,, | Performed by: PSYCHIATRY & NEUROLOGY

## 2018-09-07 PROCEDURE — 99999 PR PBB SHADOW E&M-EST. PATIENT-LVL III: CPT | Mod: PBBFAC,,, | Performed by: PSYCHIATRY & NEUROLOGY

## 2018-09-07 PROCEDURE — 3008F BODY MASS INDEX DOCD: CPT | Mod: CPTII,S$GLB,, | Performed by: PSYCHIATRY & NEUROLOGY

## 2018-09-07 RX ORDER — DIVALPROEX SODIUM 500 MG/1
1500 TABLET, FILM COATED, EXTENDED RELEASE ORAL DAILY
Qty: 270 TABLET | Refills: 3 | Status: SHIPPED | OUTPATIENT
Start: 2018-09-07 | End: 2018-11-01

## 2018-09-07 NOTE — PATIENT INSTRUCTIONS
First Aid: Seizures  A seizure results from a sudden rush of abnormal electrical signals in the brain. Symptoms may range from a minor daze to uncontrollable muscle spasms (convulsions). In many cases, the victim will lose consciousness. A seizure can be caused by a high fever, head injury, drug reaction, or condition such as epilepsy.  1. Protect the head  · Help the victim to the floor if he or she begins losing muscle control. Turn the person on his or her side to prevent choking.  · Protect the victim's head from injury by placing something soft, such as folded clothes, beneath it, and by moving objects away from the victim.  · Don't cause injury by restraining the person or by placing anything in his or her mouth.  · Remove eyeglasses.  2.  Preserve dignity  · Clear away bystanders.  · Reassure the victim, who may be confused, drowsy, or hostile when coming out of the seizure.  · Cover the person or provide dry clothes if muscle spasms have caused a loss of bladder control.  3. Check for injury  · Make sure the victim's mental state has returned to normal. One way to do this is to ask the person his or her name, the year, and your location.  · Injuries can occur to the head, mouth, tongue, or body.   4. Call 911  · If the seizure lasts longer than five minutes (Note: Timing the seizure and recovery time is helpful in many cases.)  · If a second seizure occurs  · If the victim doesnt regain consciousness  · If the victim is pregnant  · If the victim has no history of seizures  · If the person has sustained an injury during the seizure. (Note: If the injury is not severe or life threatening, it may be more appropriate to seek treatment with the primary care provider.)  Date Last Reviewed: 10/19/2015  © 0656-4809 [employer]. [address] [place] [address], [place] [address]. All rights reserved. This information is not intended as a substitute for professional medical care. Always follow your healthcare  professional's instructions.

## 2018-09-07 NOTE — PROGRESS NOTES
[facility] - NEUROLOGY EPILEPSY  [facility] [place]    Date: 9/7/18  Patient Name: Teodora Sanchez   MRN: [id number]   PCP: To Obtain Unable  Referring Provider: No ref. provider found    Assessment:      This is Teodora Sanchez, 17 y.o. male  Presenting in follow-up for management of an ongoing seizure disorder.  Patient reports breakthrough seizure in the setting of medication noncompliance.  Patient has had slightly improved compliance with reflective increase in Depakote level (noted below ).  Will increase Depakote total of 1500 mg daily with once daily dosing to improve compliance.  P  Plan:      Problem List Items Addressed This Visit        Neuro    Epilepsy    Current Assessment & Plan     -- increasing depakote to 1500 mg daily                I completed education on seizure first aid and safety. I recommended seizure precautions with regards to avoiding unsupervised water recreational activity or bathing in tubs, climbing or working at heights, operation of heavy or dangerous machinery, caution around fire and sources of high heat, as well as any other activity which could put a patient at danger in case of a seizure.  I also reviewed the [place] DMV law and recommended that the patient not drive until seizure free for a minimum of 6 months.    Charles Woodruff MD  [facility]   Department of Neurology    Patient note was created using Dragon Dictation.  Any errors in syntax or even information may not have been identified and edited on initial review prior to signing this note.  Subjective:       HPI:   Mr. Teodora Sanchez is a 17 y.o. male who presents in follow up for long-standing epilepsy.   Presenting in follow-up for management of an ongoing seizure disorder.   The patient has been seen in the emergency room on  8/8 and 9/5 for breakthrough seizures.  The patient admits that he has not been fully compliant with his medications.  Depakote level checked at his  "ER visit on 08/08 was 41.4.  Patient denies any other factors contributing to breakthrough seizures.  Patient is currently working at a [employer] plant and has left college, where he was studying [employer], without clear plans to return stating he wants to get his "health under control." His mother feels he is too irresponsible to manage his own medications.     Seizure Type: Primary generalized  Seizure Etiology: Unknown  Current AEDs: Depakote 500 mg BID    The patient is accompanied by family who contribute to the history. This patient has 2 types of seizure as described below. The patient reports having seizures for years. The patient reports to have stable seizure control. The seizure frequency is approximately monthly. The last seizure was 9/5/18 . The patient reports no side effects from seizure medication.     Seizure Type 1:   Seizure Description: Blank stare for a few minutes   Aura: No warning  Associated Symptoms: None  Seizure Frequency: Daily  Last seizure: 8th grade    Seizure Type 2:   Seizure Description: hands curl in, jerks all over, eyes flutter, postictal confusions  Aura: None  Associated Symptoms: Bites tongue  Seizure Frequency: Monthly  Last seizure: 6/5/18    Handedness: Right handed  Seizure Triggers/ Provoking Features: alcohol  consumption, sleep deprivation, stress and missed medications   Seizure Onset Age: ~5th-6th grade  Seizure/ Epilepsy Risk Factors: Childhood epilepsy, mother with childhood epilepsy  Birth/Developmental History: Was in NICU "tailbone wasn't connected." Normal developmental history.   Previous Seizure Medications: VPA, ZNS, LTG  Other Treatments: None  Episodes of Status: ~Age 13 (Cluster of 8 sz, needed repeated IV meds)  Recent Med Changes: None  Psychiatric/Behavioral Comorbitidies: None  Surgical Candidacy: N/A    Prior Studies:  EEG : 2013- 3 Hz spike and wave, 6/2018- Ambulatory Same with one generalized sz captured  vEEG/ EMU evaluation: Not done  MRI " "of brain: L temporal MTS  Other studies:Not done  AED levels: VPA 41.4 on 9/5/18, 28.2 8/8/18  CT/CTA Scan: Not done  PET Scan:Not done  Neuropsychological Evaluation: Not done  DEXA Scan: Not done    PAST MEDICAL HISTORY:  Past Medical History:   Diagnosis Date    Asthma     Seizures        PAST SURGICAL HISTORY:  Past Surgical History:   Procedure Laterality Date    CYST REMOVAL      leg     CURRENT MEDS:  Current Outpatient Medications   Medication Sig Dispense Refill    albuterol (ACCUNEB) 1.25 mg/3 mL Nebu Take 1.25 mg by nebulization every 6 (six) hours as needed.      divalproex ER (DEPAKOTE) 500 MG Tb24 Take 3 tablets (1,500 mg total) by mouth once daily. 270 tablet 3     No current facility-administered medications for this visit.      ALLERGIES:  Review of patient's allergies indicates:  No Known Allergies    FAMILY HISTORY:  History reviewed. No pertinent family history.    SOCIAL HISTORY:  Social History     Tobacco Use    Smoking status: Never Smoker    Smokeless tobacco: Never Used   Substance Use Topics    Alcohol use: No    Drug use: No       Review of Systems:  12 review of systems is negative except for the symptoms mentioned in HPI.        Objective:     Vitals:    09/07/18 1028   BP: 118/74   Pulse: 84   Weight: 88.9 kg (195 lb 15.8 oz)   Height: 5' 8" (1.727 m)       General: NAD, well nourished   Eyes: no tearing, discharge, no erythema   ENT: moist mucous membranes of the oral cavity, nares patent    Neck: Supple, Full range of motion  Cardiovascular: Warm and well perfused, pulses equal and symmetrical  Lungs: Normal work of breathing, normal chest wall excursions  Skin: No rash, lesions, or breakdown on exposed skin  Psychiatry: Mood and affect are appropriate   Abdomen: soft, non tender, non distended  Extremeties: No cyanosis, clubbing or edema.    Neurological   MENTAL STATUS: Alert and oriented to person, place, and time. Attention and concentration within normal limits. Speech " without dysarthria, able to name and repeat without difficulty. Recent and remote memory within normal limits   CRANIAL NERVES: Visual fields intact. PERRL. EOMI. Facial sensation intact. Face symmetrical. Hearing grossly intact. Full shoulder shrug bilaterally. Tongue protrudes midline   SENSORY: Sensation is intact to light touch throughout.  MOTOR: 5/5 deltoid, biceps, triceps, interosseous, hand  bilaterally. 5/5 iliopsoas, knee extension/flexion, foot dorsi/plantarflexion bilaterally.    REFLEXES: Symmetric and 2+ throughout.   CEREBELLAR/COORDINATION/GAIT: Gait steady with normal arm swing and stride length.Finger to nose intact.

## 2018-10-30 ENCOUNTER — TELEPHONE (OUTPATIENT)
Dept: NEUROLOGY | Facility: CLINIC | Age: 18
End: 2018-10-30

## 2018-10-30 ENCOUNTER — HOSPITAL ENCOUNTER (EMERGENCY)
Facility: HOSPITAL | Age: 18
Discharge: HOME | End: 2018-10-30
Attending: EMERGENCY MEDICINE
Payer: COMMERCIAL

## 2018-10-30 VITALS
WEIGHT: 198 LBS | BODY MASS INDEX: 31.08 KG/M2 | OXYGEN SATURATION: 100 % | DIASTOLIC BLOOD PRESSURE: 65 MMHG | TEMPERATURE: 98 F | RESPIRATION RATE: 20 BRPM | HEART RATE: 70 BPM | SYSTOLIC BLOOD PRESSURE: 119 MMHG | HEIGHT: 67 IN

## 2018-10-30 DIAGNOSIS — R56.9 SEIZURES: Primary | ICD-10-CM

## 2018-10-30 LAB
ALBUMIN SERPL BCP-MCNC: 3.4 G/DL
ALP SERPL-CCNC: 74 U/L
ALT SERPL W/O P-5'-P-CCNC: 76 U/L
ANION GAP SERPL CALC-SCNC: 11 MMOL/L
AST SERPL-CCNC: 34 U/L
BILIRUB SERPL-MCNC: 0.5 MG/DL
BUN SERPL-MCNC: 8 MG/DL
CALCIUM SERPL-MCNC: 8.2 MG/DL
CHLORIDE SERPL-SCNC: 109 MMOL/L
CO2 SERPL-SCNC: 20 MMOL/L
CREAT SERPL-MCNC: 0.8 MG/DL
EST. GFR  (AFRICAN AMERICAN): >60 ML/MIN/1.73 M^2
EST. GFR  (NON AFRICAN AMERICAN): >60 ML/MIN/1.73 M^2
GLUCOSE SERPL-MCNC: 78 MG/DL
POTASSIUM SERPL-SCNC: 3.7 MMOL/L
PROT SERPL-MCNC: 6.8 G/DL
SODIUM SERPL-SCNC: 140 MMOL/L
VALPROATE SERPL-MCNC: 62.8 UG/ML

## 2018-10-30 PROCEDURE — 80053 COMPREHEN METABOLIC PANEL: CPT

## 2018-10-30 PROCEDURE — 80164 ASSAY DIPROPYLACETIC ACD TOT: CPT

## 2018-10-30 PROCEDURE — 99283 EMERGENCY DEPT VISIT LOW MDM: CPT

## 2018-10-30 PROCEDURE — 25000003 PHARM REV CODE 250: Performed by: EMERGENCY MEDICINE

## 2018-10-30 RX ORDER — ACETAMINOPHEN 500 MG
1000 TABLET ORAL
Status: COMPLETED | OUTPATIENT
Start: 2018-10-30 | End: 2018-10-30

## 2018-10-30 RX ORDER — DIVALPROEX SODIUM 250 MG/1
250 TABLET, DELAYED RELEASE ORAL
Status: COMPLETED | OUTPATIENT
Start: 2018-10-30 | End: 2018-10-30

## 2018-10-30 RX ORDER — DIVALPROEX SODIUM 250 MG/1
250 TABLET, DELAYED RELEASE ORAL DAILY
Qty: 30 TABLET | Refills: 11 | Status: SHIPPED | OUTPATIENT
Start: 2018-10-30 | End: 2018-11-01 | Stop reason: ALTCHOICE

## 2018-10-30 RX ADMIN — DIVALPROEX SODIUM 250 MG: 250 TABLET, DELAYED RELEASE ORAL at 06:10

## 2018-10-30 RX ADMIN — ACETAMINOPHEN 1000 MG: 500 TABLET, FILM COATED ORAL at 04:10

## 2018-10-30 NOTE — ED PROVIDER NOTES
Encounter Date: 10/30/2018    SCRIBE #1 NOTE: I, Julio Herrera, am scribing for, and in the presence of,  Richie Purdy MD. I have scribed the following portions of the note - Other sections scribed: HPI, ROS, PE.       History     Chief Complaint   Patient presents with    Seizures     pt arrived ems with c/o seizure-like activity; per family pt had two witnessed seizures today; one this morning lastin 3-5 min and another about 30 min lasting about 20-30 min; pt has hx of seizures and currently taking depakote per family member; pt aao     CC: Seizures    HPI: This 18 y.o. Male with asthma and seizures presents to the ED via EMS for an emergent evaluation of seizures after his family witnessed x2 episodes of seizing today. Pt had one seizure this morning that lasted x3-5 mins and another seizure lasting x20-30 mins. His mother reports whether he is standing up or sitting down he always bites his L shoulder which causes L shoulder pain. Pt also suffers from headaches today. He complies with depakote for his seizures. Pt denies fever, abdominal pain, diarrhea, nausea or emesis.      The history is provided by the patient. No  was used.     Review of patient's allergies indicates:  No Known Allergies  Past Medical History:   Diagnosis Date    Asthma     Seizures      Past Surgical History:   Procedure Laterality Date    CYST REMOVAL      leg     History reviewed. No pertinent family history.  Social History     Tobacco Use    Smoking status: Never Smoker    Smokeless tobacco: Never Used   Substance Use Topics    Alcohol use: No    Drug use: No     Review of Systems   Constitutional: Negative for chills and fever.   HENT: Negative for ear pain, rhinorrhea and sore throat.    Eyes: Negative for redness.   Respiratory: Negative for shortness of breath.    Cardiovascular: Negative for chest pain.   Gastrointestinal: Negative for abdominal pain, diarrhea, nausea and vomiting.    Genitourinary: Negative for dysuria and hematuria.   Musculoskeletal: Positive for arthralgias (L shoulder). Negative for back pain and neck pain.   Skin: Negative for rash.   Neurological: Positive for seizures and headaches. Negative for weakness and numbness.   Hematological: Does not bruise/bleed easily.       Physical Exam     Initial Vitals [10/30/18 1503]   BP Pulse Resp Temp SpO2   (!) 141/67 89 20 98.5 °F (36.9 °C) 95 %      MAP       --         Physical Exam    Nursing note and vitals reviewed.  Constitutional: He appears well-developed and well-nourished. No distress.   HENT:   Head: Normocephalic and atraumatic.   Eyes: Conjunctivae and EOM are normal. Pupils are equal, round, and reactive to light.   Neck: Normal range of motion. Neck supple.   Cardiovascular: Normal rate and normal heart sounds.   Pulmonary/Chest: Effort normal and breath sounds normal.   Abdominal: Soft. Normal appearance and bowel sounds are normal. There is no tenderness.   Musculoskeletal: Normal range of motion.   Neurological: He is alert and oriented to person, place, and time. He has normal strength. No cranial nerve deficit or sensory deficit.   Skin: Skin is warm and dry.   Psychiatric: He has a normal mood and affect. His behavior is normal. Thought content normal.         ED Course   Procedures  Labs Reviewed   COMPREHENSIVE METABOLIC PANEL   VALPROIC ACID          Imaging Results    None                     Scribe Attestation:   Scribe #1: I performed the above scribed service and the documentation accurately describes the services I performed. I attest to the accuracy of the note.    Attending Attestation:           Physician Attestation for Scribe:  Physician Attestation Statement for Scribe #1: I, Richie Purdy MD, reviewed documentation, as scribed by Julio Herrera in my presence, and it is both accurate and complete.                    Clinical Impression:    Epilepsy    Disposition:   Disposition:  Discharged  18-year-old male with history of epilepsy takes Depakote he says he has been taking it he has not been sick had 2 breakthrough generalized tonic-clonic seizures today here in the ER with his mom temperature 98.5° pulse 89 respiratory 20 blood pressure 141/6795% sat on room air patient's neurological exam is completely normal he is not postictal he has no injuries chemistry normal typical level 62.8 which is therapeutic IA called Dr. Foley his neurologist and another neurologist answered me back asked me to give him 250 mg of Depakote p.o. now and prescribe him to her 50 mg of Depakote p.o. to be taken in conjunction with his extended-release of 500 mg a day and follow up with Dr. Woodruff as soon as possible he will let Dr. Foley know about this case.                        Richie Purdy MD  10/30/18 4874

## 2018-10-30 NOTE — TELEPHONE ENCOUNTER
----- Message from Ravi Pat sent at 10/30/2018  3:25 PM CDT -----  Patient Requesting Sooner Appointment.     Reason for sooner appt.: f/u for 3 seizures in past 2 weeks. Pt currently in the ER.  When is the first available appointment? 11/15 at [facility], Ms. Mims declined.  Communication Preference: Ms. Mims @ [phone]  Additional Information:

## 2018-10-30 NOTE — ED TRIAGE NOTES
Pt presents to ER via EMS after two witnessed seizures.  The first seizure was while at work.  Pt went home from work and had a second seizure.  Pt is compliant with Depakote.  Pt c/o severe headache and shoulder pain.  Pt is AAOx4 at this time.

## 2018-11-01 ENCOUNTER — OFFICE VISIT (OUTPATIENT)
Dept: NEUROLOGY | Facility: CLINIC | Age: 18
End: 2018-11-01
Payer: COMMERCIAL

## 2018-11-01 VITALS
SYSTOLIC BLOOD PRESSURE: 128 MMHG | HEART RATE: 73 BPM | HEIGHT: 67 IN | BODY MASS INDEX: 33.21 KG/M2 | WEIGHT: 211.63 LBS | DIASTOLIC BLOOD PRESSURE: 62 MMHG

## 2018-11-01 DIAGNOSIS — G40.319 INTRACTABLE GENERALIZED IDIOPATHIC EPILEPSY WITHOUT STATUS EPILEPTICUS: ICD-10-CM

## 2018-11-01 PROCEDURE — 99214 OFFICE O/P EST MOD 30 MIN: CPT | Mod: S$GLB,,, | Performed by: PSYCHIATRY & NEUROLOGY

## 2018-11-01 PROCEDURE — 3008F BODY MASS INDEX DOCD: CPT | Mod: CPTII,S$GLB,, | Performed by: PSYCHIATRY & NEUROLOGY

## 2018-11-01 PROCEDURE — 99999 PR PBB SHADOW E&M-EST. PATIENT-LVL III: CPT | Mod: PBBFAC,,, | Performed by: PSYCHIATRY & NEUROLOGY

## 2018-11-01 RX ORDER — DIVALPROEX SODIUM 500 MG/1
2500 TABLET, FILM COATED, EXTENDED RELEASE ORAL DAILY
Qty: 270 TABLET | Refills: 3 | Status: SHIPPED | OUTPATIENT
Start: 2018-11-01 | End: 2019-02-27 | Stop reason: SDUPTHER

## 2018-11-01 NOTE — PROGRESS NOTES
[facility] - NEUROLOGY EPILEPSY  [facility] [place]    Date: 11/1/18  Patient Name: Teodora Sanchez   MRN: [id number]   PCP: To Obtain Unable  Referring Provider: Other    Assessment:      This is Teodora Sanchez, 18 y.o. male  Presenting in follow-up for management of an ongoing seizure disorder.  Patient reports breakthrough seizure. Given good compliance, increasing to 2500 mg daily. 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 I recommended seizure precautions with regards to avoiding unsupervised water recreational activity or bathing in tubs, climbing or working at heights, operation of heavy or dangerous machinery, caution around fire and sources of high heat, as well as any other activity which could put a patient at danger in case of a seizure.  I also reviewed the [place] DMV law and recommended that the patient not drive until seizure free for a minimum of 6 months.    Charles Woodruff MD  [facility]   Department of Neurology    Patient note was created using Dragon Dictation.  Any errors in syntax or even information may not have been identified and edited on initial review prior to signing this note.  Subjective:       HPI:   Mr. Teodora Sanchez is a 18 y.o. male p  resenting in follow-up for intractable epilepsy.  Patient presents today with his mother who contributes to the history.  They report that over the past month he has experienced 3 breakthrough seizures most recently day before yesterday.  She denies any provoking factors such as miss medication, illness, alcohol, or lack of sleep.  He has no new complaints today.  He  "denies any side effects from medication.    Seizure Type: Primary generalized  Seizure Etiology: Unknown  Current AEDs: Depakote 1500 mg ER daily    The patient is accompanied by family who contribute to the history. This patient has 2 types of seizure as described below. The patient reports having seizures for years. The patient reports to have stable seizure control. The seizure frequency is approximately monthly. The last seizure was 10/30/ . The patient reports no side effects from seizure medication.     Seizure Type 1:   Seizure Description: Blank stare for a few minutes   Aura: No warning  Associated Symptoms: None  Seizure Frequency: Daily  Last seizure: 8th grade    Seizure Type 2:   Seizure Description: hands curl in, jerks all over, eyes flutter, postictal confusions  Aura: None  Associated Symptoms: Bites tongue, rare urinary incontinence   Seizure Frequency: Monthly  Last seizure: 10/30/18    Handedness: Right handed  Seizure Triggers/ Provoking Features: alcohol  consumption, sleep deprivation, stress and missed medications   Seizure Onset Age: ~5th-6th grade  Seizure/ Epilepsy Risk Factors: Childhood epilepsy, mother with childhood epilepsy  Birth/Developmental History: Was in NICU "\[facility]\""bone wasn't connected." Normal developmental history.   Previous Seizure Medications: VPA, ZNS, LTG  Other Treatments: None  Episodes of Status: ~Age 13 (Cluster of 8 sz, needed repeated IV meds)  Recent Med Changes: None  Psychiatric/Behavioral Comorbitidies: None  Surgical Candidacy: N/A    Prior Studies:  EEG : 2013- 3 Hz spike and wave, 6/2018- Ambulatory Same with one generalized sz captured  vEEG/ EMU evaluation: Not done  MRI of brain: L temporal MTS  Other studies:Not done  AED levels: VPA, 62.8 on 10/30/18, 41.4 on 9/5/18, 28.2 8/8/18  CT/CTA Scan: Not done  PET Scan:Not done  Neuropsychological Evaluation: Not done  DEXA Scan: Not done    PAST MEDICAL HISTORY:  Past Medical History:   Diagnosis Date    " "Asthma     Seizures        PAST SURGICAL HISTORY:  Past Surgical History:   Procedure Laterality Date    CYST REMOVAL      leg     CURRENT MEDS:  Current Outpatient Medications   Medication Sig Dispense Refill    albuterol (ACCUNEB) 1.25 mg/3 mL Nebu Take 1.25 mg by nebulization every 6 (six) hours as needed.      divalproex ER (DEPAKOTE) 500 MG Tb24 Take 5 tablets (2,500 mg total) by mouth once daily. 270 tablet 3     No current facility-administered medications for this visit.      ALLERGIES:  Review of patient's allergies indicates:  No Known Allergies    FAMILY HISTORY:  No family history on file.    SOCIAL HISTORY:  Social History     Tobacco Use    Smoking status: Never Smoker    Smokeless tobacco: Never Used   Substance Use Topics    Alcohol use: No    Drug use: No       Review of Systems:  12 review of systems is negative except for the symptoms mentioned in HPI.        Objective:     Vitals:    11/01/18 1419   BP: 128/62   Pulse: 73   Weight: 96 kg (211 lb 10.3 oz)   Height: 5' 7" (1.702 m)     General: NAD, well nourished   Eyes: no tearing, discharge, no erythema   ENT: moist mucous membranes of the oral cavity, nares patent    Neck: Supple, Full range of motion  Cardiovascular: Warm and well perfused, pulses equal and symmetrical  Lungs: Normal work of breathing, normal chest wall excursions  Skin: No rash, lesions, or breakdown on exposed skin  Psychiatry: Mood and affect are appropriate   Abdomen: soft, non tender, non distended  Extremeties: No cyanosis, clubbing or edema.    Neurological   MENTAL STATUS: Alert and oriented to person, place, and time. Attention and concentration within normal limits. Speech without dysarthria, able to name and repeat without difficulty. Recent and remote memory within normal limits   CRANIAL NERVES: Visual fields intact. PERRL. EOMI. Facial sensation intact. Face symmetrical. Hearing grossly intact. Full shoulder shrug bilaterally. Tongue protrudes midline "   SENSORY: Sensation is intact to light touch throughout.  MOTOR: 5/5 deltoid, biceps, triceps, interosseous, hand  bilaterally. 5/5 iliopsoas, knee extension/flexion, foot dorsi/plantarflexion bilaterally.    REFLEXES: Symmetric and 2+ throughout.   CEREBELLAR/COORDINATION/GAIT: Gait steady with normal arm swing and stride length.Finger to nose intact. Maximo intact

## 2018-11-01 NOTE — LETTER
November 1, 2018      Other  [address]  [place]  [facility] [address]           [place] - Neurology  [address]  [place] [address]  Phone: [phone]  Fax: [phone]          Patient: Teodora Sanchez   MR Number: [id number]   YOB: 2000   Date of Visit: 11/1/2018       Dear Other:    Thank you for referring Teodora Sanchez to me for evaluation. Attached you will find relevant portions of my assessment and plan of care.    If you have questions, please do not hesitate to call me. I look forward to following Teodora Sanchez along with you.    Sincerely,    Charles Woodruff MD    Enclosure  CC:  No Recipients    If you would like to receive this communication electronically, please contact [email or web address] or [phone] to request more information on [employer] access.    For providers and/or their staff who would like to refer a patient to [facility], please contact us through our one-stop-shop provider referral line, Nahomy Gonzalez, at [phone].    If you feel you have received this communication in error or would no longer like to receive these types of communications, please e-mail [email or web address]

## 2018-11-01 NOTE — PATIENT INSTRUCTIONS
First Aid: Seizures  A seizure results from a sudden rush of abnormal electrical signals in the brain. Symptoms may range from a minor daze to uncontrollable muscle spasms (convulsions). In many cases, the victim will lose consciousness. A seizure can be caused by a high fever, head injury, drug reaction, or condition such as epilepsy.  1. Protect the head  · Help the victim to the floor if he or she begins losing muscle control. Turn the person on his or her side to prevent choking.  · Protect the victim's head from injury by placing something soft, such as folded clothes, beneath it, and by moving objects away from the victim.  · Don't cause injury by restraining the person or by placing anything in his or her mouth.  · Remove eyeglasses.  2.  Preserve dignity  · Clear away bystanders.  · Reassure the victim, who may be confused, drowsy, or hostile when coming out of the seizure.  · Cover the person or provide dry clothes if muscle spasms have caused a loss of bladder control.  3. Check for injury  · Make sure the victim's mental state has returned to normal. One way to do this is to ask the person his or her name, the year, and your location.  · Injuries can occur to the head, mouth, tongue, or body.   4. Call 911  · If the seizure lasts longer than five minutes (Note: Timing the seizure and recovery time is helpful in many cases.)  · If a second seizure occurs  · If the victim doesnt regain consciousness  · If the victim is pregnant  · If the victim has no history of seizures  · If the person has sustained an injury during the seizure. (Note: If the injury is not severe or life threatening, it may be more appropriate to seek treatment with the primary care provider.)  Date Last Reviewed: 10/19/2015  © 7871-7164 [employer]. [address] [place] [address], [place] [address]. All rights reserved. This information is not intended as a substitute for professional medical care. Always follow your healthcare  professional's instructions.

## 2019-02-20 ENCOUNTER — PATIENT MESSAGE (OUTPATIENT)
Dept: NEUROLOGY | Facility: CLINIC | Age: 19
End: 2019-02-20

## 2019-02-20 DIAGNOSIS — Z51.81 THERAPEUTIC DRUG MONITORING: Primary | ICD-10-CM

## 2019-02-21 ENCOUNTER — LAB VISIT (OUTPATIENT)
Dept: LAB | Facility: HOSPITAL | Age: 19
End: 2019-02-21
Attending: PSYCHIATRY & NEUROLOGY
Payer: COMMERCIAL

## 2019-02-21 DIAGNOSIS — Z51.81 THERAPEUTIC DRUG MONITORING: ICD-10-CM

## 2019-02-21 LAB — VALPROATE SERPL-MCNC: 124.7 UG/ML

## 2019-02-21 PROCEDURE — 36415 COLL VENOUS BLD VENIPUNCTURE: CPT

## 2019-02-21 PROCEDURE — 80164 ASSAY DIPROPYLACETIC ACD TOT: CPT

## 2019-02-27 ENCOUNTER — OFFICE VISIT (OUTPATIENT)
Dept: NEUROLOGY | Facility: CLINIC | Age: 19
End: 2019-02-27
Payer: COMMERCIAL

## 2019-02-27 VITALS
HEIGHT: 67 IN | DIASTOLIC BLOOD PRESSURE: 71 MMHG | BODY MASS INDEX: 32.91 KG/M2 | SYSTOLIC BLOOD PRESSURE: 125 MMHG | WEIGHT: 209.69 LBS | HEART RATE: 65 BPM

## 2019-02-27 DIAGNOSIS — G89.29 CHRONIC LEFT SHOULDER PAIN: Primary | ICD-10-CM

## 2019-02-27 DIAGNOSIS — Z00.00 PREVENTATIVE HEALTH CARE: ICD-10-CM

## 2019-02-27 DIAGNOSIS — G40.319 INTRACTABLE GENERALIZED IDIOPATHIC EPILEPSY WITHOUT STATUS EPILEPTICUS: ICD-10-CM

## 2019-02-27 DIAGNOSIS — M25.512 CHRONIC LEFT SHOULDER PAIN: Primary | ICD-10-CM

## 2019-02-27 PROCEDURE — 99999 PR PBB SHADOW E&M-EST. PATIENT-LVL IV: CPT | Mod: PBBFAC,,, | Performed by: PSYCHIATRY & NEUROLOGY

## 2019-02-27 PROCEDURE — 99214 OFFICE O/P EST MOD 30 MIN: CPT | Mod: S$GLB,,, | Performed by: PSYCHIATRY & NEUROLOGY

## 2019-02-27 PROCEDURE — 3008F BODY MASS INDEX DOCD: CPT | Mod: CPTII,S$GLB,, | Performed by: PSYCHIATRY & NEUROLOGY

## 2019-02-27 PROCEDURE — 99999 PR PBB SHADOW E&M-EST. PATIENT-LVL IV: ICD-10-PCS | Mod: PBBFAC,,, | Performed by: PSYCHIATRY & NEUROLOGY

## 2019-02-27 PROCEDURE — 3008F PR BODY MASS INDEX (BMI) DOCUMENTED: ICD-10-PCS | Mod: CPTII,S$GLB,, | Performed by: PSYCHIATRY & NEUROLOGY

## 2019-02-27 PROCEDURE — 99214 PR OFFICE/OUTPT VISIT, EST, LEVL IV, 30-39 MIN: ICD-10-PCS | Mod: S$GLB,,, | Performed by: PSYCHIATRY & NEUROLOGY

## 2019-02-27 RX ORDER — ZONISAMIDE 100 MG/1
CAPSULE ORAL
Qty: 180 CAPSULE | Refills: 3 | Status: SHIPPED | OUTPATIENT
Start: 2019-02-27 | End: 2019-09-19

## 2019-02-27 RX ORDER — DIVALPROEX SODIUM 500 MG/1
2500 TABLET, FILM COATED, EXTENDED RELEASE ORAL DAILY
Qty: 270 TABLET | Refills: 3 | Status: ON HOLD | OUTPATIENT
Start: 2019-02-27 | End: 2019-09-21 | Stop reason: HOSPADM

## 2019-02-27 NOTE — PROGRESS NOTES
[facility] - NEUROLOGY EPILEPSY  [facility] [place]    Date: 2/27/19  Patient Name: Teodora Sanchez   MRN: [id number]   PCP: To Obtain Unable  Referring Provider: No ref. provider found    Assessment:      This is Teodora Sanchez, 18 y.o. male  presenting in follow-up for primary generalized epilepsy.  Patient has had variable compliance with intermittent breakthrough seizures.  Patient does appear to have had breakthrough seizures while on a therapeutic dose of Depakote.  The patient has previously been managed on Zarontin Lamictal with both discontinued for unclear reasons.  After discussion of therapeutic options, will move forward with trial of zonisamide as adjuvant.  May consider VNS pending response and pending Teodora's compliance.  Obtaining shoulder imaging today.  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 I recommended seizure precautions with regards to avoiding unsupervised water recreational activity or bathing in tubs, climbing or working at heights, operation of heavy or dangerous machinery, caution around fire and sources of high heat, as well as any other activity which could put a patient at danger in case of a seizure.  I also reviewed the [place] DMV law and recommended that the patient not drive until seizure free for a minimum of 6 months.    Charles Woodruff MD  [facility]   Department of  Neurology    Patient note was created using Dragon Dictation.  Any errors in syntax or even information may not have been identified and edited on initial review prior to signing this note.  Subjective:       HPI:   Mr. Teodora Sanchez is a 18 y.o. male presenting in follow-up for epilepsy.  Patient presents with his mother and stepfather today who contributes to the history.  Together they report that the patient has had at least 3 breakthrough seizures since his last visit.  These occurred on February 12, 18th, and 26th.  The patient does admit that he occsionally forgets his dose of Depakote stating this has happened 2 or 3 times over the last few weeks.  Of note, the patient did recently have a valproic acid level check on 02/21/2019 when his level was therapeutic at 124.7.  His parents are his primary reminder system for taking his medications. They note that it is a struggle to make sure Teodora takes his medications regularly.  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  His mother ultimately made him quit recognizing the danger of Teodora working in waist deep water.  Of note, the patient complains of left shoulder pain following his most recent seizure.    Seizure Type: Primary generalized  Seizure Etiology: Unknown  Current AEDs: Depakote 2500 mg ER daily    The patient is accompanied by family who contribute to the history. This patient has 2 types of seizure as described below. The patient reports having seizures for years. The patient reports to have stable seizure control. The seizure frequency is approximately monthly. The last seizure was 2/26/19. The patient reports no side effects from seizure medication.     Seizure Type 1:   Seizure Description: Blank stare for a few minutes   Aura: No warning  Associated Symptoms: None  Seizure Frequency: Daily  Last seizure: 8th  "grade    Seizure Type 2:   Seizure Description: hands curl in, jerks all over, eyes flutter, postictal confusions  Aura: None  Associated Symptoms: Bites tongue, rare urinary incontinence   Seizure Frequency: Up to weekly depending on compliance  Last seizure: 2/26/19    Handedness: Right handed  Seizure Triggers/ Provoking Features: alcohol  consumption, sleep deprivation, stress and missed medications   Seizure Onset Age: ~5th-6th grade  Seizure/ Epilepsy Risk Factors: Childhood epilepsy, mother with childhood epilepsy  Birth/Developmental History: Was in NICU "tailbone wasn't connected." Normal developmental history.   Previous Seizure Medications: VPA, Ethosuxamide, LTG  Other Treatments: None  Episodes of Status: ~Age 13 (Cluster of 8 sz, needed repeated IV meds)  Recent Med Changes: None  Psychiatric/Behavioral Comorbitidies: Poor medication compliance  Surgical Candidacy: N/A    Prior Studies:  EEG : 2013- 3 Hz spike and wave, 6/2018- Ambulatory Same with one generalized sz captured  vEEG/ EMU evaluation: Not done  MRI of brain: L temporal MTS  Other studies:Not done  AED levels: VPA, 62.8 on 10/30/18, 41.4 on 9/5/18, 28.2 8/8/18  CT/CTA Scan: Not done  PET Scan:Not done  Neuropsychological Evaluation: Not done  DEXA Scan: Not done    PAST MEDICAL HISTORY:  Past Medical History:   Diagnosis Date    Asthma     Seizures        PAST SURGICAL HISTORY:  Past Surgical History:   Procedure Laterality Date    CYST REMOVAL      leg     CURRENT MEDS:  Current Outpatient Medications   Medication Sig Dispense Refill    albuterol (ACCUNEB) 1.25 mg/3 mL Nebu Take 1.25 mg by nebulization every 6 (six) hours as needed.      divalproex ER (DEPAKOTE) 500 MG Tb24 Take 5 tablets (2,500 mg total) by mouth once daily. 270 tablet 3    zonisamide (ZONEGRAN) 100 MG Cap Take 1 capsule (100 mg total) by mouth once daily for 14 days, THEN 2 capsules (200 mg total) once daily. 180 capsule 3     No current facility-administered " "medications for this visit.      ALLERGIES:  Review of patient's allergies indicates:  No Known Allergies    FAMILY HISTORY:  History reviewed. No pertinent family history.    SOCIAL HISTORY:  Social History     Tobacco Use    Smoking status: Never Smoker    Smokeless tobacco: Never Used   Substance Use Topics    Alcohol use: No    Drug use: No       Review of Systems:  12 review of systems is negative except for the symptoms mentioned in HPI.        Objective:     Vitals:    02/27/19 1304   BP: 125/71   Pulse: 65   Weight: 95.1 kg (209 lb 10.5 oz)   Height: 5' 7" (1.702 m)     General: NAD, well nourished   Eyes: no tearing, discharge, no erythema   ENT: moist mucous membranes of the oral cavity, nares patent    Neck: Supple, Full range of motion  Cardiovascular: Warm and well perfused, pulses equal and symmetrical  Lungs: Normal work of breathing, normal chest wall excursions  Skin: No rash, lesions, or breakdown on exposed skin  Psychiatry: Mood and affect are appropriate   Abdomen: soft, non tender, non distended  Extremeties: No cyanosis, clubbing or edema.    Neurological   MENTAL STATUS: Alert and oriented to person, place, and time. Attention and concentration within normal limits. Speech without dysarthria, able to name and repeat without difficulty. Recent and remote memory within normal limits   CRANIAL NERVES: Visual fields intact. PERRL. EOMI. Facial sensation intact. Face symmetrical. Hearing grossly intact. Full shoulder shrug bilaterally. Tongue protrudes midline   SENSORY: Sensation is intact to light touch throughout.  MOTOR: 5/5 deltoid, biceps, triceps, interosseous, hand  bilaterally. 5/5 iliopsoas, knee extension/flexion, foot dorsi/plantarflexion bilaterally.    REFLEXES: Symmetric and 2+ throughout.   CEREBELLAR/COORDINATION/GAIT: Gait steady with normal arm swing and stride length.Finger to nose intact.   "

## 2019-03-01 ENCOUNTER — HOSPITAL ENCOUNTER (OUTPATIENT)
Dept: RADIOLOGY | Facility: HOSPITAL | Age: 19
Discharge: HOME | End: 2019-03-01
Attending: PSYCHIATRY & NEUROLOGY
Payer: COMMERCIAL

## 2019-03-01 ENCOUNTER — TELEPHONE (OUTPATIENT)
Dept: NEUROLOGY | Facility: CLINIC | Age: 19
End: 2019-03-01

## 2019-03-01 DIAGNOSIS — G89.29 CHRONIC LEFT SHOULDER PAIN: ICD-10-CM

## 2019-03-01 DIAGNOSIS — Z87.81 HISTORY OF LEFT SHOULDER FRACTURE: Primary | ICD-10-CM

## 2019-03-01 DIAGNOSIS — M25.512 CHRONIC LEFT SHOULDER PAIN: ICD-10-CM

## 2019-03-01 PROCEDURE — 73030 XR SHOULDER COMPLETE 2 OR MORE VIEWS LEFT: ICD-10-PCS | Mod: 26,LT,, | Performed by: RADIOLOGY

## 2019-03-01 PROCEDURE — 73030 X-RAY EXAM OF SHOULDER: CPT | Mod: TC,FY,LT

## 2019-03-01 PROCEDURE — 73030 X-RAY EXAM OF SHOULDER: CPT | Mod: 26,LT,, | Performed by: RADIOLOGY

## 2019-03-01 NOTE — TELEPHONE ENCOUNTER
----- Message from Charles Woodruff MD sent at 3/1/2019  1:11 PM CST -----  Contact: Kimberly (mother)/[phone]  Can we get him scheduled with [facility] here and will you let me know with who? I'll need to make sure someone can follow up his shoulder MRI while I'm out.    ----- Message -----  From: Tatyana Wolfe MA  Sent: 3/1/2019   1:02 PM  To: Charles Woodruff MD        ----- Message -----  From: Sebastián Zavala  Sent: 3/1/2019  12:28 PM  To: Ranjan FARMER Staff    Patient's mother is returning your call.      Please call and advise.

## 2019-03-01 NOTE — LETTER
March 1, 2019    Teodora Sanchez  [address] [facility] [place] [address]             [place] - Neurology  [address] [place] [address]  [place] [address]  Phone: [phone]  Fax: [phone] Dear Mr. Sanchez      I have been trying to reach you for your orders to Family Medicine.  Please contact the clinic to book this referral.        If you have any questions or concerns, please don't hesitate to call [phone]        Sincerely,      Elida [facility] Referral Coordinator

## 2019-03-04 ENCOUNTER — OFFICE VISIT (OUTPATIENT)
Dept: FAMILY MEDICINE | Facility: CLINIC | Age: 19
End: 2019-03-04
Payer: COMMERCIAL

## 2019-03-04 VITALS
OXYGEN SATURATION: 98 % | HEART RATE: 59 BPM | BODY MASS INDEX: 26.47 KG/M2 | TEMPERATURE: 98 F | RESPIRATION RATE: 17 BRPM | WEIGHT: 168.63 LBS | HEIGHT: 67 IN | SYSTOLIC BLOOD PRESSURE: 121 MMHG | DIASTOLIC BLOOD PRESSURE: 71 MMHG

## 2019-03-04 DIAGNOSIS — Z00.00 HEALTH CARE MAINTENANCE: ICD-10-CM

## 2019-03-04 DIAGNOSIS — S42.145A: Primary | ICD-10-CM

## 2019-03-04 DIAGNOSIS — Z13.220 LIPID SCREENING: ICD-10-CM

## 2019-03-04 PROCEDURE — 3008F PR BODY MASS INDEX (BMI) DOCUMENTED: ICD-10-PCS | Mod: CPTII,S$GLB,, | Performed by: INTERNAL MEDICINE

## 2019-03-04 PROCEDURE — 99203 PR OFFICE/OUTPT VISIT, NEW, LEVL III, 30-44 MIN: ICD-10-PCS | Mod: S$GLB,,, | Performed by: INTERNAL MEDICINE

## 2019-03-04 PROCEDURE — 99999 PR PBB SHADOW E&M-EST. PATIENT-LVL III: CPT | Mod: PBBFAC,,, | Performed by: INTERNAL MEDICINE

## 2019-03-04 PROCEDURE — 99203 OFFICE O/P NEW LOW 30 MIN: CPT | Mod: S$GLB,,, | Performed by: INTERNAL MEDICINE

## 2019-03-04 PROCEDURE — 3008F BODY MASS INDEX DOCD: CPT | Mod: CPTII,S$GLB,, | Performed by: INTERNAL MEDICINE

## 2019-03-04 PROCEDURE — 99999 PR PBB SHADOW E&M-EST. PATIENT-LVL III: ICD-10-PCS | Mod: PBBFAC,,, | Performed by: INTERNAL MEDICINE

## 2019-03-04 NOTE — PROGRESS NOTES
SUBJECTIVE     Chief Complaint   Patient presents with    Establish Care    MRI     need to discuss MRI       HPI  Teodora Sanchez is a 18 y.o. male with multiple medical diagnoses as listed in the medical history and problem list that presents for evaluation of L shoulder fracture for unknown amount of time. Pt has epilepsy and has grand mal seizures. He has been fully compliant with meds and denies any adverse side effects. His last seizure was 2/26/19, so Zonegram was recently added to the Depakote. Per pt's mother, he always complains of L shoulder pain after the seizure so his Neurologist did an xray that confirmed a shoulder fracture with recommended MRI for further follow up. Pt can not qualify pain, but it is at a 6/10 and intermittent in nature without radiation. Pt has not been taking any meds for his symptoms.     PAST MEDICAL HISTORY:  Past Medical History:   Diagnosis Date    Asthma     Seizures        PAST SURGICAL HISTORY:  Past Surgical History:   Procedure Laterality Date    CYST REMOVAL      leg       SOCIAL HISTORY:  Social History     Socioeconomic History    Marital status: Single     Spouse name: Not on file    Number of children: Not on file    Years of education: Not on file    Highest education level: Not on file   Social Needs    Financial resource strain: Not on file    Food insecurity - worry: Not on file    Food insecurity - inability: Not on file    Transportation needs - medical: Not on file    Transportation needs - non-medical: Not on file   Occupational History    Not on file   Tobacco Use    Smoking status: Never Smoker    Smokeless tobacco: Never Used   Substance and Sexual Activity    Alcohol use: No    Drug use: No    Sexual activity: Not on file   Other Topics Concern    Not on file   Social History Narrative    Not on file       FAMILY HISTORY:  Family History   Problem Relation Age of Onset    Hyperlipidemia Mother     No Known Problems Father   "      ALLERGIES AND MEDICATIONS: updated and reviewed.  Review of patient's allergies indicates:  No Known Allergies  Current Outpatient Medications   Medication Sig Dispense Refill    albuterol (ACCUNEB) 1.25 mg/3 mL Nebu Take 1.25 mg by nebulization every 6 (six) hours as needed.      divalproex ER (DEPAKOTE) 500 MG Tb24 Take 5 tablets (2,500 mg total) by mouth once daily. 270 tablet 3    zonisamide (ZONEGRAN) 100 MG Cap Take 1 capsule (100 mg total) by mouth once daily for 14 days, THEN 2 capsules (200 mg total) once daily. 180 capsule 3     No current facility-administered medications for this visit.        ROS  Review of Systems   Constitutional: Negative for chills and fever.   HENT: Negative for hearing loss and sore throat.    Eyes: Negative for visual disturbance.   Respiratory: Negative for cough and shortness of breath.    Cardiovascular: Negative for chest pain, palpitations and leg swelling.   Gastrointestinal: Negative for abdominal pain, constipation, diarrhea, nausea and vomiting.   Genitourinary: Negative for dysuria, frequency and urgency.   Musculoskeletal: Positive for arthralgias (L shoulder pain). Negative for joint swelling and myalgias.   Skin: Negative for rash and wound.   Neurological: Negative for headaches.   Psychiatric/Behavioral: Negative for agitation and confusion. The patient is not nervous/anxious.          OBJECTIVE     Physical Exam  Vitals:    03/04/19 1114   BP: 121/71   Pulse: (!) 59   Resp: 17   Temp: 98.3 °F (36.8 °C)    Body mass index is 26.41 kg/m².  Weight: 76.5 kg (168 lb 10.4 oz)   Height: 5' 7" (170.2 cm)     Physical Exam   Constitutional: He is oriented to person, place, and time. He appears well-developed and well-nourished. No distress.   HENT:   Head: Normocephalic and atraumatic.   Right Ear: External ear normal.   Left Ear: External ear normal.   Nose: Nose normal.   Mouth/Throat: Oropharynx is clear and moist.   Eyes: Conjunctivae and EOM are normal. Right " eye exhibits no discharge. Left eye exhibits no discharge. No scleral icterus.   Neck: Normal range of motion. Neck supple. No JVD present. No tracheal deviation present.   Cardiovascular: Normal rate, regular rhythm, normal heart sounds and intact distal pulses. Exam reveals no gallop and no friction rub.   No murmur heard.  Pulmonary/Chest: Effort normal and breath sounds normal. No respiratory distress. He has no wheezes.   Abdominal: Soft. Bowel sounds are normal. He exhibits no distension and no mass. There is no tenderness. There is no rebound and no guarding.   Musculoskeletal: He exhibits no edema, tenderness or deformity.   ROM limited 2/2 pain at L shoulder   Neurological: He is alert and oriented to person, place, and time. He exhibits normal muscle tone. Coordination normal.   Skin: Skin is warm and dry. No rash noted. No erythema.   Psychiatric: He has a normal mood and affect. His behavior is normal. Judgment and thought content normal.         Health Maintenance       Date Due Completion Date    Lipid Panel 1998 ---    HPV Vaccines (1 - Male 3-dose series) 10/01/2013 ---    TETANUS VACCINE 10/01/2016 ---    Influenza Vaccine 08/01/2018 ---            ASSESSMENT     18 y.o. male with     1. Nondisplaced fracture of glenoid cavity of scapula, left shoulder, initial encounter for closed fracture    2. Health care maintenance    3. Lipid screening        PLAN:     1. Nondisplaced fracture of glenoid cavity of scapula, left shoulder, initial encounter for closed fracture  - Independently reviewed xray with pt and MRI ordered by Neurology  - Will send to Ortho for management options  - Ambulatory consult to Orthopedics  - Pt encouraged to apply ice packs 2-3 times daily at 10 minute intervals x 72 hours, then okay to change to heating compress with care not to burn his self; he  voiced understanding   - Pt advised to take NSAIDs or Tylenol prn pain  - Pt advised to avoid basketball and other sports at  this time    2. Health care maintenance  - Comprehensive metabolic panel; Future  - CBC auto differential; Future  - TSH; Future  - Hemoglobin A1c; Future    3. Lipid screening  - Lipid panel; Future        RTC in 6 weeks for repeat assessment of current treatment plan       Shayna Dubose MD  03/04/2019 11:45 AM        No Follow-up on file.

## 2019-03-08 ENCOUNTER — HOSPITAL ENCOUNTER (OUTPATIENT)
Dept: RADIOLOGY | Facility: HOSPITAL | Age: 19
Discharge: HOME | End: 2019-03-08
Attending: PSYCHIATRY & NEUROLOGY
Payer: COMMERCIAL

## 2019-03-08 ENCOUNTER — HOSPITAL ENCOUNTER (EMERGENCY)
Facility: HOSPITAL | Age: 19
Discharge: HOME | End: 2019-03-08
Attending: EMERGENCY MEDICINE
Payer: COMMERCIAL

## 2019-03-08 VITALS
HEART RATE: 93 BPM | BODY MASS INDEX: 31.32 KG/M2 | SYSTOLIC BLOOD PRESSURE: 117 MMHG | DIASTOLIC BLOOD PRESSURE: 57 MMHG | RESPIRATION RATE: 20 BRPM | TEMPERATURE: 101 F | WEIGHT: 200 LBS | OXYGEN SATURATION: 95 %

## 2019-03-08 DIAGNOSIS — R05.9 COUGH: ICD-10-CM

## 2019-03-08 DIAGNOSIS — Z87.81 HISTORY OF LEFT SHOULDER FRACTURE: ICD-10-CM

## 2019-03-08 DIAGNOSIS — J10.1 INFLUENZA A: Primary | ICD-10-CM

## 2019-03-08 DIAGNOSIS — R50.9 FEVER: ICD-10-CM

## 2019-03-08 LAB
CTP QC/QA: YES
DEPRECATED S PYO AG THROAT QL EIA: NEGATIVE
FLUAV AG NPH QL: POSITIVE
FLUBV AG NPH QL: NEGATIVE

## 2019-03-08 PROCEDURE — 87502 INFLUENZA DNA AMP PROBE: CPT

## 2019-03-08 PROCEDURE — 25000003 PHARM REV CODE 250: Performed by: PHYSICIAN ASSISTANT

## 2019-03-08 PROCEDURE — 99284 EMERGENCY DEPT VISIT MOD MDM: CPT | Mod: 25

## 2019-03-08 PROCEDURE — 73221 MRI SHOULDER WITHOUT CONTRAST LEFT: ICD-10-PCS | Mod: 26,LT,, | Performed by: RADIOLOGY

## 2019-03-08 PROCEDURE — 25000003 PHARM REV CODE 250: Performed by: NURSE PRACTITIONER

## 2019-03-08 PROCEDURE — 73221 MRI JOINT UPR EXTREM W/O DYE: CPT | Mod: 26,LT,, | Performed by: RADIOLOGY

## 2019-03-08 PROCEDURE — 73221 MRI JOINT UPR EXTREM W/O DYE: CPT | Mod: TC,LT

## 2019-03-08 PROCEDURE — 87081 CULTURE SCREEN ONLY: CPT

## 2019-03-08 PROCEDURE — 87880 STREP A ASSAY W/OPTIC: CPT

## 2019-03-08 RX ORDER — IBUPROFEN 600 MG/1
600 TABLET ORAL
Status: COMPLETED | OUTPATIENT
Start: 2019-03-08 | End: 2019-03-08

## 2019-03-08 RX ORDER — IBUPROFEN 600 MG/1
600 TABLET ORAL EVERY 6 HOURS PRN
Qty: 20 TABLET | Refills: 0 | Status: SHIPPED | OUTPATIENT
Start: 2019-03-08 | End: 2019-09-19

## 2019-03-08 RX ORDER — ACETAMINOPHEN 325 MG/1
650 TABLET ORAL
Status: COMPLETED | OUTPATIENT
Start: 2019-03-08 | End: 2019-03-08

## 2019-03-08 RX ORDER — OSELTAMIVIR PHOSPHATE 75 MG/1
75 CAPSULE ORAL 2 TIMES DAILY
Qty: 10 CAPSULE | Refills: 0 | Status: SHIPPED | OUTPATIENT
Start: 2019-03-08 | End: 2019-03-13

## 2019-03-08 RX ORDER — IBUPROFEN 400 MG/1
400 TABLET ORAL EVERY 4 HOURS
COMMUNITY
End: 2019-09-19

## 2019-03-08 RX ADMIN — ACETAMINOPHEN 650 MG: 325 TABLET, FILM COATED ORAL at 11:03

## 2019-03-08 RX ADMIN — IBUPROFEN 600 MG: 600 TABLET ORAL at 11:03

## 2019-03-08 NOTE — DISCHARGE INSTRUCTIONS
Take Tamiflu twice daily for 5 days.  Ibuprofen and Tylenol for fevers and body aches as needed only as prescribed.  Follow-up with your regular doctor or the one listed on your discharge paperwork for further treatment.  Return to the emergency department for any new or worsening symptoms or as needed for any additional concerns.    Thank you for coming to our Emergency Department today. It is important to remember that some problems are difficult to diagnose and may not be found during your first visit. Be sure to follow up with your primary care doctor.  If you do not have one, you may contact the one listed on your discharge paperwork or you may also call the [facility] Appointment Desk at [phone] to schedule an appointment with one.     Return to the ER with any questions/concerns, new/concerning symptoms, worsening or failure to improve. Do not drive or make any important decisions for 24 hours if you have received any pain medications, sedatives or mood altering drugs during your ER visit.

## 2019-03-08 NOTE — ED PROVIDER NOTES
Encounter Date: 3/8/2019  18 y.o. male with cough, myalgias, fever.  Patient will be seen by another provider for further evaluation when an exam room is available. Vitor REYNOSO, 10:38 AM    SCRIBE #1 NOTE: I, Julio Herrera, am scribing for, and in the presence of,  Dennis Ellington NP. I have scribed the following portions of the note - Other sections scribed: HPI, ROS.       History     Chief Complaint   Patient presents with    Cough     cough and bodyaches, pt refuses to talk, mother talks for him    Generalized Body Aches     CC: Cough    HPI: This 18 y.o. Male with asthma and seizures presents to the ED for an evaluation of cough, fever, congestion, sore throat and generalized body aches which began yesterday. Pt has not taken any meds for his symptoms. He denies abdominal pain, back pain, nausea, emesis, chest pain or SOB.      The history is provided by the patient. No  was used.     Review of patient's allergies indicates:  No Known Allergies  Past Medical History:   Diagnosis Date    Asthma     Seizures      Past Surgical History:   Procedure Laterality Date    CYST REMOVAL      leg     Family History   Problem Relation Age of Onset    Hyperlipidemia Mother     No Known Problems Father      Social History     Tobacco Use    Smoking status: Never Smoker    Smokeless tobacco: Never Used   Substance Use Topics    Alcohol use: No    Drug use: No     Review of Systems   Constitutional: Positive for fever. Negative for chills.   HENT: Positive for congestion and sore throat. Negative for ear pain and rhinorrhea.    Eyes: Negative for redness.   Respiratory: Positive for cough. Negative for shortness of breath.    Cardiovascular: Negative for chest pain.   Gastrointestinal: Negative for abdominal pain, diarrhea, nausea and vomiting.   Genitourinary: Negative for dysuria and hematuria.   Musculoskeletal: Positive for arthralgias. Negative for back pain and neck pain.   Skin: Negative  for rash.   Neurological: Negative for weakness, numbness and headaches.   Hematological: Does not bruise/bleed easily.       Physical Exam     Initial Vitals [03/08/19 1037]   BP Pulse Resp Temp SpO2   132/70 (!) 116 20 (!) 103.1 °F (39.5 °C) (!) 94 %      MAP       --         Physical Exam    Nursing note and vitals reviewed.  Constitutional: He appears well-developed and well-nourished. He is not diaphoretic. He is active and cooperative.  Non-toxic appearance. He does not have a sickly appearance. He appears ill. No distress.   Patient is mildly ill-appearing but nontoxic, awake, alert, pleasant, and in no distress   HENT:   Head: Normocephalic and atraumatic.   Right Ear: Hearing, tympanic membrane, external ear and ear canal normal.   Left Ear: Hearing, tympanic membrane, external ear and ear canal normal.   Nose: Nose normal.   Mouth/Throat: Uvula is midline. No trismus in the jaw. Posterior oropharyngeal erythema present. No oropharyngeal exudate, posterior oropharyngeal edema or tonsillar abscesses.   TMs and ear canals are normal bilaterally.  There is mild posterior oropharyngeal erythema without edema or exudate. No tonsillar enlargement, exudate, or erythema bilaterally.  Uvula is midline. No frontal or maxillary sinus tenderness. No appreciable submandibular or cervical adenopathy.   Eyes: Conjunctivae and EOM are normal. Pupils are equal, round, and reactive to light. Right eye exhibits no discharge. Left eye exhibits no discharge. No scleral icterus.   Neck: Trachea normal, normal range of motion, full passive range of motion without pain and phonation normal. Neck supple. No thyromegaly present. No stridor present. No tracheal tenderness present. No tracheal deviation present. No JVD present.   Neck is supple full nonpainful active and passive range of motion.  No meningeal signs.   Cardiovascular: Regular rhythm, normal heart sounds and intact distal pulses. Exam reveals no gallop and no friction  rub.    No murmur heard.  Pulmonary/Chest: Effort normal and breath sounds normal. No accessory muscle usage or stridor. No tachypnea. No respiratory distress. He has no wheezes. He has no rhonchi. He has no rales. He exhibits no tenderness.   Lungs are clear to auscultation bilaterally in all fields.  No tachypnea, increased work of breathing, respiratory distress.   Abdominal: Soft. Bowel sounds are normal. He exhibits no distension and no mass. There is no tenderness. There is no rebound and no guarding.   Musculoskeletal: Normal range of motion. He exhibits no edema or tenderness.   Lymphadenopathy:     He has no cervical adenopathy.   Neurological: He is alert and oriented to person, place, and time. He has normal strength and normal reflexes. He displays normal reflexes. No cranial nerve deficit or sensory deficit.   Skin: Skin is warm and dry. No rash and no abscess noted. No erythema. No pallor.   Psychiatric: He has a normal mood and affect. His behavior is normal. Judgment and thought content normal.         ED Course   Procedures  Labs Reviewed   POCT INFLUENZA A/B - Abnormal; Notable for the following components:       Result Value    Rapid Influenza A Ag Positive (*)     All other components within normal limits   THROAT SCREEN, RAPID   CULTURE, STREP A,  THROAT          Imaging Results          X-Ray Chest PA And Lateral (Final result)  Result time 03/08/19 12:31:46    Final result by Dean Bermeo MD (03/08/19 12:31:46)                 Impression:      1. No acute cardiopulmonary process, hypoventilatory exam.      Electronically signed by: Dean Bermeo MD  Date:    03/08/2019  Time:    12:31             Narrative:    EXAMINATION:  XR CHEST PA AND LATERAL    CLINICAL HISTORY:  Fever, unspecified    TECHNIQUE:  PA and lateral views of the chest were performed.    COMPARISON:  09/05/2018    FINDINGS:  The cardiomediastinal silhouette is not enlarged..  There is no pleural effusion.  The trachea  is midline.  The lungs are symmetrically expanded bilaterally without evidence of acute parenchymal process. No large focal consolidation seen.  There is no pneumothorax.  The osseous structures are unremarkable.                                 Medical Decision Making:   History:   Old Medical Records: I decided to obtain old medical records.  Differential Diagnosis:   Influenza, viral URI, otitis media, sinusitis, pharyngitis, pneumonia, bronchitis, sepsis, meningitis, others  Clinical Tests:   Lab Tests: Ordered and Reviewed  ED Management:  The patient appears to have influenza A.  Based upon the history and physical exam the patient does not appear to have a serious bacterial infection such as pneumonia, sepsis, otitis media, bacterial sinusitis, strep pharyngitis, parapharyngeal or peritonsillar abscess, meningitis.  Influenza swab positive for influenza A.  Chest x-ray is unremarkable. Strep swab was negative. Respiratory effort is normal with no signs of increased work of breathing or respiratory distress. Mucous membranes are moist and the patient is tolerating P.O. without difficulty.  Temperature and heart rate are reduced following treatment with antipyretics.  Patient is afebrile, alert, active, and appears very well at this time and I have given specific return precautions to the patient and/or family members.  Prescribed Tamiflu and ibuprofen at discharge.    The results and physical exam findings were reviewed with the patient. Advised patient to follow up with his PCP for re-evaluation and further management.  ED return precautions given. All questions regarding diagnosis and plan were answered to the patient's fullest possible satisfaction. Patient expressed understanding of diagnosis, discharge instructions, and return precautions.              Scribe Attestation:   Scribe #1: I performed the above scribed service and the documentation accurately describes the services I performed. I attest to the  accuracy of the note.    Attending Attestation:           Physician Attestation for Scribe:  Physician Attestation Statement for Scribe #1: I, Dennis Ellington NP, reviewed documentation, as scribed by Julio Herrera in my presence, and it is both accurate and complete.                    Clinical Impression:       ICD-10-CM ICD-9-CM   1. Influenza A J10.1 487.1   2. Cough R05 786.2   3. Fever R50.9 780.60         Disposition:   Disposition: Discharged  Condition: Stable                        Dennis Ellington NP  03/08/19 3808

## 2019-03-10 LAB — BACTERIA THROAT CULT: NORMAL

## 2019-03-11 ENCOUNTER — TELEPHONE (OUTPATIENT)
Dept: FAMILY MEDICINE | Facility: CLINIC | Age: 19
End: 2019-03-11

## 2019-04-29 ENCOUNTER — LAB VISIT (OUTPATIENT)
Dept: LAB | Facility: HOSPITAL | Age: 19
End: 2019-04-29
Attending: INTERNAL MEDICINE
Payer: COMMERCIAL

## 2019-04-29 DIAGNOSIS — Z00.00 HEALTH CARE MAINTENANCE: ICD-10-CM

## 2019-04-29 DIAGNOSIS — Z13.220 LIPID SCREENING: ICD-10-CM

## 2019-04-29 LAB
ALBUMIN SERPL BCP-MCNC: 4.3 G/DL (ref 3.5–5.2)
ALP SERPL-CCNC: 84 U/L (ref 55–135)
ALT SERPL W/O P-5'-P-CCNC: 19 U/L (ref 10–44)
ANION GAP SERPL CALC-SCNC: 10 MMOL/L (ref 8–16)
AST SERPL-CCNC: 20 U/L (ref 10–40)
BASOPHILS # BLD AUTO: 0.01 K/UL (ref 0–0.2)
BASOPHILS NFR BLD: 0.2 % (ref 0–1.9)
BILIRUB SERPL-MCNC: 0.7 MG/DL (ref 0.1–1)
BUN SERPL-MCNC: 13 MG/DL (ref 6–20)
CALCIUM SERPL-MCNC: 9.9 MG/DL (ref 8.7–10.5)
CHLORIDE SERPL-SCNC: 104 MMOL/L (ref 95–110)
CHOLEST SERPL-MCNC: 174 MG/DL (ref 120–199)
CHOLEST/HDLC SERPL: 5.4 {RATIO} (ref 2–5)
CO2 SERPL-SCNC: 24 MMOL/L (ref 23–29)
CREAT SERPL-MCNC: 0.9 MG/DL (ref 0.5–1.4)
DIFFERENTIAL METHOD: ABNORMAL
EOSINOPHIL # BLD AUTO: 0.1 K/UL (ref 0–0.5)
EOSINOPHIL NFR BLD: 2 % (ref 0–8)
ERYTHROCYTE [DISTWIDTH] IN BLOOD BY AUTOMATED COUNT: 14.4 % (ref 11.5–14.5)
EST. GFR  (AFRICAN AMERICAN): >60 ML/MIN/1.73 M^2
EST. GFR  (NON AFRICAN AMERICAN): >60 ML/MIN/1.73 M^2
GLUCOSE SERPL-MCNC: 66 MG/DL (ref 70–110)
HCT VFR BLD AUTO: 41 % (ref 40–54)
HDLC SERPL-MCNC: 32 MG/DL (ref 40–75)
HDLC SERPL: 18.4 % (ref 20–50)
HGB BLD-MCNC: 13.3 G/DL (ref 14–18)
LDLC SERPL CALC-MCNC: 97.6 MG/DL (ref 63–159)
LYMPHOCYTES # BLD AUTO: 3.2 K/UL (ref 1–4.8)
LYMPHOCYTES NFR BLD: 58.5 % (ref 18–48)
MCH RBC QN AUTO: 29.8 PG (ref 27–31)
MCHC RBC AUTO-ENTMCNC: 32.4 G/DL (ref 32–36)
MCV RBC AUTO: 92 FL (ref 82–98)
MONOCYTES # BLD AUTO: 0.6 K/UL (ref 0.3–1)
MONOCYTES NFR BLD: 10.4 % (ref 4–15)
NEUTROPHILS # BLD AUTO: 1.6 K/UL (ref 1.8–7.7)
NEUTROPHILS NFR BLD: 28.9 % (ref 38–73)
NONHDLC SERPL-MCNC: 142 MG/DL
PLATELET # BLD AUTO: 289 K/UL (ref 150–350)
PMV BLD AUTO: 11.2 FL (ref 9.2–12.9)
POTASSIUM SERPL-SCNC: 3.8 MMOL/L (ref 3.5–5.1)
PROT SERPL-MCNC: 8.1 G/DL (ref 6–8.4)
RBC # BLD AUTO: 4.47 M/UL (ref 4.6–6.2)
SODIUM SERPL-SCNC: 138 MMOL/L (ref 136–145)
T4 FREE SERPL-MCNC: 1.23 NG/DL (ref 0.71–1.51)
TRIGL SERPL-MCNC: 222 MG/DL (ref 30–150)
TSH SERPL DL<=0.005 MIU/L-ACNC: 7.76 UIU/ML (ref 0.4–4)
WBC # BLD AUTO: 5.49 K/UL (ref 3.9–12.7)

## 2019-04-29 PROCEDURE — 84443 ASSAY THYROID STIM HORMONE: CPT

## 2019-04-29 PROCEDURE — 80053 COMPREHEN METABOLIC PANEL: CPT

## 2019-04-29 PROCEDURE — 84439 ASSAY OF FREE THYROXINE: CPT

## 2019-04-29 PROCEDURE — 36415 COLL VENOUS BLD VENIPUNCTURE: CPT | Mod: PO

## 2019-04-29 PROCEDURE — 85025 COMPLETE CBC W/AUTO DIFF WBC: CPT

## 2019-04-29 PROCEDURE — 83036 HEMOGLOBIN GLYCOSYLATED A1C: CPT

## 2019-04-29 PROCEDURE — 80061 LIPID PANEL: CPT

## 2019-04-30 DIAGNOSIS — E78.1 PURE HYPERGLYCERIDEMIA: ICD-10-CM

## 2019-04-30 DIAGNOSIS — R79.89 ABNORMAL TSH: Primary | ICD-10-CM

## 2019-04-30 DIAGNOSIS — D64.9 NORMOCYTIC ANEMIA: ICD-10-CM

## 2019-04-30 LAB
ESTIMATED AVG GLUCOSE: 105 MG/DL (ref 68–131)
HBA1C MFR BLD HPLC: 5.3 % (ref 4–5.6)

## 2019-05-03 ENCOUNTER — OFFICE VISIT (OUTPATIENT)
Dept: FAMILY MEDICINE | Facility: CLINIC | Age: 19
End: 2019-05-03
Payer: COMMERCIAL

## 2019-05-03 VITALS
HEART RATE: 58 BPM | BODY MASS INDEX: 31.49 KG/M2 | OXYGEN SATURATION: 98 % | HEIGHT: 67 IN | SYSTOLIC BLOOD PRESSURE: 122 MMHG | WEIGHT: 200.63 LBS | TEMPERATURE: 98 F | DIASTOLIC BLOOD PRESSURE: 84 MMHG | RESPIRATION RATE: 18 BRPM

## 2019-05-03 DIAGNOSIS — E78.1 PURE HYPERGLYCERIDEMIA: ICD-10-CM

## 2019-05-03 DIAGNOSIS — B35.9 RINGWORM: ICD-10-CM

## 2019-05-03 DIAGNOSIS — Z71.2 ENCOUNTER TO DISCUSS TEST RESULTS: Primary | ICD-10-CM

## 2019-05-03 PROCEDURE — 3008F PR BODY MASS INDEX (BMI) DOCUMENTED: ICD-10-PCS | Mod: CPTII,S$GLB,, | Performed by: INTERNAL MEDICINE

## 2019-05-03 PROCEDURE — 99999 PR PBB SHADOW E&M-EST. PATIENT-LVL III: CPT | Mod: PBBFAC,,, | Performed by: INTERNAL MEDICINE

## 2019-05-03 PROCEDURE — 99999 PR PBB SHADOW E&M-EST. PATIENT-LVL III: ICD-10-PCS | Mod: PBBFAC,,, | Performed by: INTERNAL MEDICINE

## 2019-05-03 PROCEDURE — 99214 PR OFFICE/OUTPT VISIT, EST, LEVL IV, 30-39 MIN: ICD-10-PCS | Mod: S$GLB,,, | Performed by: INTERNAL MEDICINE

## 2019-05-03 PROCEDURE — 99214 OFFICE O/P EST MOD 30 MIN: CPT | Mod: S$GLB,,, | Performed by: INTERNAL MEDICINE

## 2019-05-03 PROCEDURE — 3008F BODY MASS INDEX DOCD: CPT | Mod: CPTII,S$GLB,, | Performed by: INTERNAL MEDICINE

## 2019-05-03 RX ORDER — FENOFIBRATE 160 MG/1
160 TABLET ORAL DAILY
Qty: 90 TABLET | Refills: 3 | Status: ON HOLD | OUTPATIENT
Start: 2019-05-03 | End: 2019-09-27 | Stop reason: HOSPADM

## 2019-05-03 RX ORDER — KETOCONAZOLE 20 MG/G
CREAM TOPICAL DAILY
Qty: 30 G | Refills: 0 | Status: ON HOLD | OUTPATIENT
Start: 2019-05-03 | End: 2019-09-27 | Stop reason: HOSPADM

## 2019-05-03 NOTE — PROGRESS NOTES
SUBJECTIVE     Chief Complaint   Patient presents with    Follow-up     labs       HPI  Teodora Sanchez is a 18 y.o. male with multiple medical diagnoses as listed in the medical history and problem list that presents for follow-up to discuss labs. Pt had labs drawn last week and received the information of [place]. He presents today for clarification and to start any possible medical therapy. He denies any complaints. Denies any changes in bowel habits as his constipation remains unchanged from baseline. He denies any hair loss/thinning or fatigue.     PAST MEDICAL HISTORY:  Past Medical History:   Diagnosis Date    Asthma     Seizures        PAST SURGICAL HISTORY:  Past Surgical History:   Procedure Laterality Date    CYST REMOVAL      leg       SOCIAL HISTORY:  Social History     Socioeconomic History    Marital status: Single     Spouse name: Not on file    Number of children: Not on file    Years of education: Not on file    Highest education level: Not on file   Occupational History    Not on file   Social Needs    Financial resource strain: Very hard    Food insecurity:     Worry: Never true     Inability: Never true    Transportation needs:     Medical: No     Non-medical: No   Tobacco Use    Smoking status: Never Smoker    Smokeless tobacco: Never Used   Substance and Sexual Activity    Alcohol use: No     Frequency: Never     Drinks per session: Patient refused     Binge frequency: Never    Drug use: No    Sexual activity: Not on file   Lifestyle    Physical activity:     Days per week: 0 days     Minutes per session: 0 min    Stress: Not at all   Relationships    Social connections:     Talks on phone: Twice a week     Gets together: Twice a week     Attends Judaism service: Not on file     Active member of club or organization: No     Attends meetings of clubs or organizations: Never     Relationship status: Never    Other Topics Concern    Not on file   Social  History Narrative    Not on file       FAMILY HISTORY:  Family History   Problem Relation Age of Onset    Hyperlipidemia Mother     No Known Problems Father        ALLERGIES AND MEDICATIONS: updated and reviewed.  Review of patient's allergies indicates:  No Known Allergies  Current Outpatient Medications   Medication Sig Dispense Refill    albuterol (ACCUNEB) 1.25 mg/3 mL Nebu Take 1.25 mg by nebulization every 6 (six) hours as needed.      divalproex ER (DEPAKOTE) 500 MG Tb24 Take 5 tablets (2,500 mg total) by mouth once daily. 270 tablet 3    fenofibrate 160 MG Tab Take 1 tablet (160 mg total) by mouth once daily. 90 tablet 3    ibuprofen (ADVIL,MOTRIN) 400 MG tablet Take 400 mg by mouth every 4 (four) hours.      ibuprofen (ADVIL,MOTRIN) 600 MG tablet Take 1 tablet (600 mg total) by mouth every 6 (six) hours as needed (Fever). 20 tablet 0    ketoconazole (NIZORAL) 2 % cream Apply topically once daily. 30 g 0    zonisamide (ZONEGRAN) 100 MG Cap Take 1 capsule (100 mg total) by mouth once daily for 14 days, THEN 2 capsules (200 mg total) once daily. 180 capsule 3     No current facility-administered medications for this visit.        ROS  Review of Systems   Constitutional: Negative for activity change, chills, fever and unexpected weight change.   HENT: Negative for hearing loss, rhinorrhea, sore throat and trouble swallowing.    Eyes: Negative for discharge and visual disturbance.   Respiratory: Negative for cough, chest tightness, shortness of breath and wheezing.    Cardiovascular: Negative for chest pain, palpitations and leg swelling.   Gastrointestinal: Negative for abdominal pain, blood in stool, constipation, diarrhea, nausea and vomiting.   Endocrine: Negative for polydipsia and polyuria.   Genitourinary: Negative for difficulty urinating, dysuria, frequency, hematuria and urgency.   Musculoskeletal: Negative for arthralgias, joint swelling, myalgias and neck pain.   Skin: Positive for rash.  "Negative for wound.   Neurological: Negative for weakness and headaches.   Psychiatric/Behavioral: Negative for agitation, confusion and dysphoric mood. The patient is not nervous/anxious.          OBJECTIVE     Physical Exam  Vitals:    05/03/19 0901   BP: 122/84   Pulse: (!) 58   Resp: 18   Temp: 97.5 °F (36.4 °C)    Body mass index is 31.42 kg/m².  Weight: 91 kg (200 lb 9.9 oz)   Height: 5' 7" (170.2 cm)     Physical Exam   Constitutional: He is oriented to person, place, and time. He appears well-developed and well-nourished. No distress.   HENT:   Head: Normocephalic and atraumatic.   Right Ear: External ear normal.   Left Ear: External ear normal.   Nose: Nose normal.   Mouth/Throat: Oropharynx is clear and moist.   Eyes: Conjunctivae and EOM are normal. Right eye exhibits no discharge. Left eye exhibits no discharge. No scleral icterus.   Neck: Normal range of motion. Neck supple. No JVD present. No tracheal deviation present.   Cardiovascular: Normal rate, regular rhythm, normal heart sounds and intact distal pulses. Exam reveals no gallop and no friction rub.   No murmur heard.  Pulmonary/Chest: Effort normal and breath sounds normal. No respiratory distress. He has no wheezes.   Abdominal: Soft. Bowel sounds are normal. He exhibits no distension and no mass. There is no tenderness. There is no rebound and no guarding.   Musculoskeletal: Normal range of motion. He exhibits no edema, tenderness or deformity.   Neurological: He is alert and oriented to person, place, and time. He exhibits normal muscle tone. Coordination normal.   Skin: Skin is warm and dry. No rash noted. No erythema.   Ringworm to R posterior neck   Psychiatric: He has a normal mood and affect. His behavior is normal. Judgment and thought content normal.         Health Maintenance       Date Due Completion Date    HPV Vaccines (1 - Male 3-dose series) 10/01/2013 ---    TETANUS VACCINE 10/01/2016 ---    Influenza Vaccine 08/01/2019 ---    "         ASSESSMENT     18 y.o. male with     1. Encounter to discuss test results    2. Pure hyperglyceridemia    3. Ringworm        PLAN:     1. Encounter to discuss test results  - Discussed recent lab results  - All questions/concerns addressed  - Pt voiced understanding    2. Pure hyperglyceridemia  - Recommend pt start low cholesterol diet and exercise  - fenofibrate 160 MG Tab; Take 1 tablet (160 mg total) by mouth once daily.  Dispense: 90 tablet; Refill: 3    3. Ringworm  - ketoconazole (NIZORAL) 2 % cream; Apply topically once daily.  Dispense: 30 g; Refill: 0        RTC in 6 months     Shayna Dubose MD  05/03/2019 9:19 AM        No follow-ups on file.

## 2019-05-06 ENCOUNTER — HOSPITAL ENCOUNTER (EMERGENCY)
Facility: HOSPITAL | Age: 19
Discharge: HOME | End: 2019-05-06
Attending: EMERGENCY MEDICINE
Payer: COMMERCIAL

## 2019-05-06 VITALS
BODY MASS INDEX: 30.01 KG/M2 | SYSTOLIC BLOOD PRESSURE: 103 MMHG | OXYGEN SATURATION: 99 % | HEIGHT: 68 IN | TEMPERATURE: 98 F | HEART RATE: 79 BPM | WEIGHT: 198 LBS | DIASTOLIC BLOOD PRESSURE: 55 MMHG | RESPIRATION RATE: 18 BRPM

## 2019-05-06 DIAGNOSIS — R56.9 SEIZURE: Primary | ICD-10-CM

## 2019-05-06 LAB
ALBUMIN SERPL BCP-MCNC: 4.3 G/DL (ref 3.5–5.2)
ALP SERPL-CCNC: 83 U/L (ref 55–135)
ALT SERPL W/O P-5'-P-CCNC: 25 U/L (ref 10–44)
ANION GAP SERPL CALC-SCNC: 8 MMOL/L (ref 8–16)
AST SERPL-CCNC: 19 U/L (ref 10–40)
BASOPHILS # BLD AUTO: 0.01 K/UL (ref 0–0.2)
BASOPHILS NFR BLD: 0.2 % (ref 0–1.9)
BILIRUB SERPL-MCNC: 0.5 MG/DL (ref 0.1–1)
BUN SERPL-MCNC: 14 MG/DL (ref 6–20)
CALCIUM SERPL-MCNC: 9.7 MG/DL (ref 8.7–10.5)
CHLORIDE SERPL-SCNC: 106 MMOL/L (ref 95–110)
CO2 SERPL-SCNC: 26 MMOL/L (ref 23–29)
CREAT SERPL-MCNC: 0.9 MG/DL (ref 0.5–1.4)
DIFFERENTIAL METHOD: ABNORMAL
EOSINOPHIL # BLD AUTO: 0.2 K/UL (ref 0–0.5)
EOSINOPHIL NFR BLD: 4.4 % (ref 0–8)
ERYTHROCYTE [DISTWIDTH] IN BLOOD BY AUTOMATED COUNT: 14 % (ref 11.5–14.5)
EST. GFR  (AFRICAN AMERICAN): >60 ML/MIN/1.73 M^2
EST. GFR  (NON AFRICAN AMERICAN): >60 ML/MIN/1.73 M^2
GLUCOSE SERPL-MCNC: 79 MG/DL (ref 70–110)
HCT VFR BLD AUTO: 39 % (ref 40–54)
HGB BLD-MCNC: 12.8 G/DL (ref 14–18)
LYMPHOCYTES # BLD AUTO: 2.2 K/UL (ref 1–4.8)
LYMPHOCYTES NFR BLD: 48 % (ref 18–48)
MAGNESIUM SERPL-MCNC: 1.9 MG/DL (ref 1.6–2.6)
MCH RBC QN AUTO: 29.5 PG (ref 27–31)
MCHC RBC AUTO-ENTMCNC: 32.8 G/DL (ref 32–36)
MCV RBC AUTO: 90 FL (ref 82–98)
MONOCYTES # BLD AUTO: 0.3 K/UL (ref 0.3–1)
MONOCYTES NFR BLD: 7.2 % (ref 4–15)
NEUTROPHILS # BLD AUTO: 1.8 K/UL (ref 1.8–7.7)
NEUTROPHILS NFR BLD: 40.6 % (ref 38–73)
PLATELET # BLD AUTO: 236 K/UL (ref 150–350)
PMV BLD AUTO: 10.7 FL (ref 9.2–12.9)
POTASSIUM SERPL-SCNC: 4.2 MMOL/L (ref 3.5–5.1)
PROT SERPL-MCNC: 8.2 G/DL (ref 6–8.4)
RBC # BLD AUTO: 4.34 M/UL (ref 4.6–6.2)
SODIUM SERPL-SCNC: 140 MMOL/L (ref 136–145)
VALPROATE SERPL-MCNC: 111.5 UG/ML (ref 50–100)
WBC # BLD AUTO: 4.56 K/UL (ref 3.9–12.7)

## 2019-05-06 PROCEDURE — 80053 COMPREHEN METABOLIC PANEL: CPT

## 2019-05-06 PROCEDURE — 99283 EMERGENCY DEPT VISIT LOW MDM: CPT

## 2019-05-06 PROCEDURE — 80164 ASSAY DIPROPYLACETIC ACD TOT: CPT

## 2019-05-06 PROCEDURE — 85025 COMPLETE CBC W/AUTO DIFF WBC: CPT

## 2019-05-06 PROCEDURE — 83735 ASSAY OF MAGNESIUM: CPT

## 2019-05-06 PROCEDURE — 25000003 PHARM REV CODE 250: Performed by: EMERGENCY MEDICINE

## 2019-05-06 RX ORDER — DIVALPROEX SODIUM 500 MG/1
500 TABLET, FILM COATED, EXTENDED RELEASE ORAL
Status: COMPLETED | OUTPATIENT
Start: 2019-05-06 | End: 2019-05-06

## 2019-05-06 RX ADMIN — DIVALPROEX SODIUM 500 MG: 500 TABLET, FILM COATED, EXTENDED RELEASE ORAL at 01:05

## 2019-05-06 NOTE — ED PROVIDER NOTES
Encounter Date: 5/6/2019    SCRIBE #1 NOTE: I, Cheryl Saldivar, am scribing for, and in the presence of,  Laura Ramos MD. I have scribed the following portions of the note - Other sections scribed: HPI, ROS, PE.       History     Chief Complaint   Patient presents with    Seizures     Witnessed seizure - fell to ground.  Abrasion to right side of face near eye.  Last seizure 3 mths ago.  Compliant with meds.  AAO x 4.     CC: Seizures    HPI: This 18 y.o male who has asthma and seizures presents to the ED for an evaluation for an acute onset seizure that occurred at 0800.  Patient reports his last seizure 4 days ago.  He reports 2 days ago, he missed a dose of his seizure medication and reports missing a dose this morning.  Patient reports his last evaluation by his neurologist February 2020.  Patient reports he is currently on Depakote, but reports his neurologist placing him on a new medication, but can not recall it at this time.  Patient denies fever, chills, nausea, emesis, diarrhea, abdominal pain, or any other associated symptoms. No prior tx.  No alleviating factors.    The history is provided by the patient. No  was used.     Review of patient's allergies indicates:  No Known Allergies  Past Medical History:   Diagnosis Date    Asthma     Seizures      Past Surgical History:   Procedure Laterality Date    CYST REMOVAL      leg     Family History   Problem Relation Age of Onset    Hyperlipidemia Mother     No Known Problems Father      Social History     Tobacco Use    Smoking status: Never Smoker    Smokeless tobacco: Never Used   Substance Use Topics    Alcohol use: No     Frequency: Never     Drinks per session: Patient refused     Binge frequency: Never    Drug use: No     Review of Systems   Constitutional: Negative for chills and fever.   HENT: Negative for ear pain and sore throat.    Eyes: Negative for pain.   Respiratory: Negative for cough and shortness of breath.     Cardiovascular: Negative for chest pain.   Gastrointestinal: Negative for abdominal pain, diarrhea, nausea and vomiting.   Genitourinary: Negative for dysuria.   Musculoskeletal: Negative for back pain.        (-) arm or leg problems   Skin: Negative for rash.   Neurological: Positive for seizures. Negative for dizziness, weakness, numbness and headaches.       Physical Exam     Initial Vitals [05/06/19 1010]   BP Pulse Resp Temp SpO2   109/64 78 18 99 °F (37.2 °C) 99 %      MAP       --         Physical Exam    Nursing note and vitals reviewed.  Constitutional: He is not diaphoretic. No distress.   HENT:   Head: Normocephalic and atraumatic.   Mouth/Throat: Oropharynx is clear and moist.   Eyes: Conjunctivae and EOM are normal. Pupils are equal, round, and reactive to light. No scleral icterus.   Neck: Normal range of motion. Neck supple. No JVD present.   Cardiovascular: Normal rate, regular rhythm and intact distal pulses.   Pulmonary/Chest: Breath sounds normal. No stridor. No respiratory distress.   Abdominal: Soft. Bowel sounds are normal. He exhibits no distension. There is no tenderness.   Musculoskeletal: Normal range of motion. He exhibits no edema or tenderness.   Neurological: He is alert and oriented to person, place, and time. He has normal strength. No cranial nerve deficit.   Skin: Skin is warm and dry. No rash noted.   Psychiatric: He has a normal mood and affect.         ED Course   Procedures  Labs Reviewed   CBC W/ AUTO DIFFERENTIAL - Abnormal; Notable for the following components:       Result Value    RBC 4.34 (*)     Hemoglobin 12.8 (*)     Hematocrit 39.0 (*)     All other components within normal limits   VALPROIC ACID - Abnormal; Notable for the following components:    Valproic Acid Level 111.5 (*)     All other components within normal limits   COMPREHENSIVE METABOLIC PANEL   MAGNESIUM   ZONISAMIDE LEVEL          Imaging Results    None          Medical Decision Making:   History:   Old  Medical Records: I decided to obtain old medical records.  Old Records Summarized: records from clinic visits.       <> Summary of Records: Most recently seen in neurology clinic in 02/2019 for breakthrough seizure while therapeutic on Depakote.  Patient started on zonisamide for adjuvant therapy.  Differential Diagnosis:   Breakthrough seizure  Medication noncompliance  Electrolyte abnormality    Clinical Tests:   Lab Tests: Ordered and Reviewed  ED Management:  Patient is afebrile and in no acute distress at time history and physical.  He has no focal neurological deficits.  He is neurovascularly intact. Labs within acceptable limits without significant electrolyte abnormality.  Depakote level is slightly supratherapeutic at 111.5.  Patient is unclear on when he last took zonisamide.  Zonisamide level is pending.  Patient has remained stable in the emergency department with outs further seizure activity.  Patient counseled to contact his neurologist to address what dosages of Depakote and zonisamide he should be taking can see is currently unaware patient denies lightheadedness, dizziness, numbness, weakness, chest pain or breathing difficulty.  He does not appear to have clinical signs of Depakote toxicity.  He is stable for follow-up with his neurologist.  He has been counseled to hold this evening's dose of Depakote and to resume tomorrow at his usual dosage with close consultation with his neurologist.  Patient's mother and grandfather present for discharge instructions and they expressed understanding as while and report that they will follow closely with neurologist to determine the appropriate dosages of seizure medications that patient should be taking. counseled on supportive care, appropriate medication usage, concerning symptoms for which to return to ER and the importance of follow up. Understanding and agreement with treatment plan was expressed.   This chart was completed using dictation software, as  a result there may be some transcription errors.             Scribe Attestation:   Scribe #1: I performed the above scribed service and the documentation accurately describes the services I performed. I attest to the accuracy of the note.    Attending Attestation:           Physician Attestation for Scribe:  Physician Attestation Statement for Scribe #1: I, Laura Ramos MD, reviewed documentation, as scribed by Cheryl Saldivar in my presence, and it is both accurate and complete.                    Clinical Impression:       ICD-10-CM ICD-9-CM   1. Seizure R56.9 780.39         Disposition:   Disposition: Discharged  Condition: Stable                        Laura Ramos MD  05/06/19 [id number]

## 2019-05-06 NOTE — DISCHARGE INSTRUCTIONS
Your Depakote level was slightly elevated in the emergency to room today.  Do not take her evening dose of Depakote today but resume tomorrow as you have been.  Continue zonisamide as you have been taking it, the zonisamide levels are a send out test will not be available for 1-2 days.  It is very important that you contact your neurologist today to confirm what dosages of your seizure medications you should be taking and to schedule a close follow-up appointment.  Return to the emergency department for numbness, weakness, vision changes, recurring seizures or any new, worsening or concerning symptoms.

## 2019-05-06 NOTE — ED TRIAGE NOTES
"Pt presents to ED via EMS with mother at bedside; pt had witnessed seizure for unknown amount of time; + LOC; pt's mother says that pt's normal seizure time is 2-3 mins; when asked about pt's seizure presentation, he laughs and states "I don't know"; pt fell to ground and hit his face on the ground near his eye' small abrasion to (R) side of face near eye; per mother, she says that pt has had 7 seizures in the past 2 weeks; pt is compliant with meds; AAOx4; VSS; will continue to monitor  "

## 2019-05-07 NOTE — ED NOTES
Notified by Poonam in lab about cancellation of Zonisamide lab. She reports that in order for test to be completed, the serum needs to be  within 2 hours in which it was not. She informed me that due to separation not being done,the test will be cancelled.

## 2019-07-30 ENCOUNTER — PATIENT MESSAGE (OUTPATIENT)
Dept: NEUROLOGY | Facility: CLINIC | Age: 19
End: 2019-07-30

## 2019-09-05 ENCOUNTER — LAB VISIT (OUTPATIENT)
Dept: LAB | Facility: HOSPITAL | Age: 19
End: 2019-09-05
Attending: PSYCHIATRY & NEUROLOGY
Payer: COMMERCIAL

## 2019-09-05 ENCOUNTER — PATIENT MESSAGE (OUTPATIENT)
Dept: NEUROLOGY | Facility: CLINIC | Age: 19
End: 2019-09-05

## 2019-09-05 ENCOUNTER — OFFICE VISIT (OUTPATIENT)
Dept: NEUROLOGY | Facility: CLINIC | Age: 19
End: 2019-09-05
Payer: COMMERCIAL

## 2019-09-05 VITALS
WEIGHT: 199.75 LBS | HEART RATE: 85 BPM | BODY MASS INDEX: 30.27 KG/M2 | HEIGHT: 68 IN | DIASTOLIC BLOOD PRESSURE: 76 MMHG | SYSTOLIC BLOOD PRESSURE: 118 MMHG

## 2019-09-05 DIAGNOSIS — Z51.81 THERAPEUTIC DRUG MONITORING: Primary | ICD-10-CM

## 2019-09-05 DIAGNOSIS — Z51.81 THERAPEUTIC DRUG MONITORING: ICD-10-CM

## 2019-09-05 LAB — VALPROATE SERPL-MCNC: <12.5 UG/ML (ref 50–100)

## 2019-09-05 PROCEDURE — 99214 PR OFFICE/OUTPT VISIT, EST, LEVL IV, 30-39 MIN: ICD-10-PCS | Mod: S$GLB,,, | Performed by: PSYCHIATRY & NEUROLOGY

## 2019-09-05 PROCEDURE — 80203 DRUG SCREEN QUANT ZONISAMIDE: CPT

## 2019-09-05 PROCEDURE — 99999 PR PBB SHADOW E&M-EST. PATIENT-LVL III: CPT | Mod: PBBFAC,,, | Performed by: PSYCHIATRY & NEUROLOGY

## 2019-09-05 PROCEDURE — 80164 ASSAY DIPROPYLACETIC ACD TOT: CPT

## 2019-09-05 PROCEDURE — 99999 PR PBB SHADOW E&M-EST. PATIENT-LVL III: ICD-10-PCS | Mod: PBBFAC,,, | Performed by: PSYCHIATRY & NEUROLOGY

## 2019-09-05 PROCEDURE — 99214 OFFICE O/P EST MOD 30 MIN: CPT | Mod: S$GLB,,, | Performed by: PSYCHIATRY & NEUROLOGY

## 2019-09-05 PROCEDURE — 80307 DRUG TEST PRSMV CHEM ANLYZR: CPT

## 2019-09-05 PROCEDURE — 3008F BODY MASS INDEX DOCD: CPT | Mod: CPTII,S$GLB,, | Performed by: PSYCHIATRY & NEUROLOGY

## 2019-09-05 PROCEDURE — 3008F PR BODY MASS INDEX (BMI) DOCUMENTED: ICD-10-PCS | Mod: CPTII,S$GLB,, | Performed by: PSYCHIATRY & NEUROLOGY

## 2019-09-05 NOTE — PROGRESS NOTES
[facility] - NEUROLOGY EPILEPSY  [facility] [place]    Date: 9/5/19  Patient Name: Teodora Sanchez   MRN: [id number]   PCP: Shayna Dubose  Referring Provider: No ref. provider found    Assessment:      This is Teodora Sanchez, 18 y.o. male presenting in follow-up for epilepsy.  Patient has history of medication noncompliance.  Again checking levels today to assess compliance.  May consider further zonisamide dose increase pending results versus new AED.  Counseled patient on epilepsy safety and compliance.  Have advised patient if he remains noncompliant will be difficult to continue our therapeutic relationship.  Plan:      Problem List Items Addressed This Visit     None      Visit Diagnoses     Therapeutic drug monitoring    -  Primary    Relevant Orders    Zonisamide level    Valproic Acid    DRUGS OF ABUSE SCREEN, BLOOD        I completed education on seizure first aid and safety. I recommended seizure precautions with regards to avoiding unsupervised water recreational activity or bathing in tubs, climbing or working at heights, operation of heavy or dangerous machinery, caution around fire and sources of high heat, as well as any other activity which could put a patient at danger in case of a seizure.  I also reviewed the [place] DMV law and recommended that the patient not drive until seizure free for 6 months.    Charles Woodruff MD  [facility]   Department of Neurology    Patient note was created using MModal Dictation.  Any errors in syntax or even information may not have been identified and edited on initial review prior to signing this note.  Subjective:     HPI:   Mr. Teodora Sanchez is a 18 y.o. male presenting in follow-up for epilepsy.  The patient presents today with his mother who contributes to the history.  The patient last presented to the emergency room in May 2019 with multiple breakthrough seizures.  The patient endorsed medication  noncompliance at that time.  Of note, however his Depakote level was therapeutic however it appears he had not taken his zonisamide as directed.  His mother reports that he continues to have breakthrough seizures frequently and including as many as 2 per day with over 10 seizures occurring in the last 2 weeks.  She states that typically she sees the patient taking his medicine but admits that there are times when he may be missing it.  Patient also endorses heavy marijuana use despite being told to avoid this.  He has also started a new job working a night shift in his mother believes his sleep is poor.  The patient smokes and giggles throughout much of the visit.  He spends part of the visit on his cellphone playing games and is largely disengaged.  He last and says what ever when I asked why he did not report his recent breakthrough seizures.  He appears very indifferent to his condition.    Seizure Type: Primary generalized  Seizure Etiology: Unknown  Current AEDs: Depakote 2500 mg ER daily    The patient is accompanied by family who contribute to the history. This patient has 2 types of seizure as described below. The patient reports having seizures for years. The patient reports to have stable seizure control. The seizure frequency is approximately monthly. The last seizure was 2/26/19. The patient reports no side effects from seizure medication.     Seizure Type 1:   Seizure Description: Blank stare for a few minutes   Aura: No warning  Associated Symptoms: None  Seizure Frequency: Daily  Last seizure: 8th grade    Seizure Type 2:   Seizure Description: hands curl in, jerks all over, eyes flutter, postictal confusions  Aura: None  Associated Symptoms: Bites tongue, rare urinary incontinence   Seizure Frequency: Up to weekly depending on compliance  Last seizure: 2/26/19    Handedness: Right handed  Seizure Triggers/ Provoking Features: alcohol  consumption, sleep deprivation, stress and missed medications  "  Seizure Onset Age: ~5th-6th grade  Seizure/ Epilepsy Risk Factors: Childhood epilepsy, mother with childhood epilepsy  Birth/Developmental History: Was in NICU "tailbone wasn't connected." Normal developmental history.   Previous Seizure Medications: VPA, Ethosuxamide, LTG  Other Treatments: None  Episodes of Status: ~Age 13 (Cluster of 8 sz, needed repeated IV meds)  Recent Med Changes: None  Psychiatric/Behavioral Comorbitidies: Poor medication compliance  Surgical Candidacy: N/A    Prior Studies:  EEG : 2013- 3 Hz spike and wave, 6/2018- Ambulatory Same with one generalized sz captured  vEEG/ EMU evaluation: Not done  MRI of brain: L temporal MTS  Other studies:Not done  AED levels: VPA, 62.8 on 10/30/18, 41.4 on 9/5/18, 28.2 8/8/18  CT/CTA Scan: Not done  PET Scan:Not done  Neuropsychological Evaluation: Not done  DEXA Scan: Not done    PAST MEDICAL HISTORY:  Past Medical History:   Diagnosis Date    Asthma     Seizures        PAST SURGICAL HISTORY:  Past Surgical History:   Procedure Laterality Date    CYST REMOVAL      leg     CURRENT MEDS:  Current Outpatient Medications   Medication Sig Dispense Refill    albuterol (ACCUNEB) 1.25 mg/3 mL Nebu Take 1.25 mg by nebulization every 6 (six) hours as needed.      divalproex ER (DEPAKOTE) 500 MG Tb24 Take 5 tablets (2,500 mg total) by mouth once daily. 270 tablet 3    fenofibrate 160 MG Tab Take 1 tablet (160 mg total) by mouth once daily. 90 tablet 3    ibuprofen (ADVIL,MOTRIN) 400 MG tablet Take 400 mg by mouth every 4 (four) hours.      ibuprofen (ADVIL,MOTRIN) 600 MG tablet Take 1 tablet (600 mg total) by mouth every 6 (six) hours as needed (Fever). 20 tablet 0    ketoconazole (NIZORAL) 2 % cream Apply topically once daily. 30 g 0    zonisamide (ZONEGRAN) 100 MG Cap Take 1 capsule (100 mg total) by mouth once daily for 14 days, THEN 2 capsules (200 mg total) once daily. 180 capsule 3     No current facility-administered medications for this visit.  " "    ALLERGIES:  Review of patient's allergies indicates:  No Known Allergies    FAMILY HISTORY:  Family History   Problem Relation Age of Onset    Hyperlipidemia Mother     No Known Problems Father        SOCIAL HISTORY:  Social History     Tobacco Use    Smoking status: Never Smoker    Smokeless tobacco: Never Used   Substance Use Topics    Alcohol use: No     Frequency: Never     Drinks per session: Patient refused     Binge frequency: Never    Drug use: No       Review of Systems:  12 review of systems is negative except for the symptoms mentioned in HPI.        Objective:     Vitals:    09/05/19 1410   BP: 118/76   Pulse: 85   Weight: 90.6 kg (199 lb 11.8 oz)   Height: 5' 8" (1.727 m)     General: NAD, well nourished   Eyes: no tearing, discharge, no erythema   ENT: moist mucous membranes of the oral cavity, nares patent    Neck: Supple, Full range of motion  Cardiovascular: Warm and well perfused, pulses equal and symmetrical  Lungs: Normal work of breathing, normal chest wall excursions  Skin: No rash, lesions, or breakdown on exposed skin  Psychiatry: Mood and affect are appropriate   Abdomen: soft, non tender, non distended  Extremeties: No cyanosis, clubbing or edema.    Neurological   MENTAL STATUS: Alert and oriented to person, place, and time. Attention and concentration within normal limits. Speech without dysarthria, able to name and repeat without difficulty. Recent and remote memory within normal limits   CRANIAL NERVES: Visual fields intact. PERRL. EOMI. Facial sensation intact. Face symmetrical. Hearing grossly intact. Full shoulder shrug bilaterally. Tongue protrudes midline   SENSORY: Sensation is intact to light touch throughout.  MOTOR: 5/5 deltoid, biceps, triceps, interosseous, hand  bilaterally. 5/5 iliopsoas, knee extension/flexion, foot dorsi/plantarflexion bilaterally.    REFLEXES: Symmetric and 2+ throughout.   CEREBELLAR/COORDINATION/GAIT: Gait steady with normal arm swing and " stride length.Finger to nose intact.

## 2019-09-07 LAB
AMPHETAMINES SERPL QL: NEGATIVE
BARBITURATES SERPL QL SCN: NEGATIVE
BENZODIAZ SERPL QL SCN: NEGATIVE
BZE SERPL QL: NEGATIVE
CARBOXYTHC SERPL QL SCN: POSITIVE
ETHANOL SERPL QL SCN: NEGATIVE
METHADONE SERPL QL SCN: NEGATIVE
OPIATES SERPL QL SCN: NEGATIVE
PCP SERPL QL SCN: NEGATIVE
PROPOXYPH SERPL QL: NEGATIVE
ZONISAMIDE SERPL-MCNC: <1 MCG/ML (ref 10–40)

## 2019-09-19 ENCOUNTER — HOSPITAL ENCOUNTER (OUTPATIENT)
Facility: HOSPITAL | Age: 19
Discharge: HOME | End: 2019-09-21
Attending: EMERGENCY MEDICINE | Admitting: INTERNAL MEDICINE
Payer: COMMERCIAL

## 2019-09-19 DIAGNOSIS — G40.319 INTRACTABLE GENERALIZED IDIOPATHIC EPILEPSY WITHOUT STATUS EPILEPTICUS: ICD-10-CM

## 2019-09-19 DIAGNOSIS — F23: ICD-10-CM
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
## 2019-09-19 DIAGNOSIS — R56.9 SEIZURE: ICD-10-CM

## 2019-09-19 DIAGNOSIS — F32.2 MAJOR DEPRESSIVE DISORDER, SEVERE: ICD-10-CM

## 2019-09-19 DIAGNOSIS — G40.909 RECURRENT SEIZURES: Primary | ICD-10-CM

## 2019-09-19 DIAGNOSIS — M25.512 ACUTE PAIN OF LEFT SHOULDER: ICD-10-CM

## 2019-09-19 PROCEDURE — 93010 ELECTROCARDIOGRAM REPORT: CPT | Mod: ,,, | Performed by: INTERNAL MEDICINE

## 2019-09-19 PROCEDURE — 96375 TX/PRO/DX INJ NEW DRUG ADDON: CPT

## 2019-09-19 PROCEDURE — 96365 THER/PROPH/DIAG IV INF INIT: CPT

## 2019-09-19 PROCEDURE — 93005 ELECTROCARDIOGRAM TRACING: CPT

## 2019-09-19 PROCEDURE — 93010 EKG 12-LEAD: ICD-10-PCS | Mod: ,,, | Performed by: INTERNAL MEDICINE

## 2019-09-19 PROCEDURE — 99285 EMERGENCY DEPT VISIT HI MDM: CPT | Mod: 25

## 2019-09-19 PROCEDURE — 96376 TX/PRO/DX INJ SAME DRUG ADON: CPT

## 2019-09-20 ENCOUNTER — PATIENT MESSAGE (OUTPATIENT)
Dept: NEUROLOGY | Facility: CLINIC | Age: 19
End: 2019-09-20

## 2019-09-20 PROBLEM — G40.909 RECURRENT SEIZURES: Status: ACTIVE | Noted: 2019-09-20

## 2019-09-20 PROBLEM — F32.2 MAJOR DEPRESSIVE DISORDER, SEVERE: Status: ACTIVE | Noted: 2019-09-20

## 2019-09-20 PROBLEM — F12.10 MARIJUANA ABUSE: Status: ACTIVE | Noted: 2019-09-20

## 2019-09-20 LAB
ALBUMIN SERPL BCP-MCNC: 4.6 G/DL (ref 3.5–5.2)
ALP SERPL-CCNC: 93 U/L (ref 55–135)
ALT SERPL W/O P-5'-P-CCNC: 20 U/L (ref 10–44)
AMPHET+METHAMPHET UR QL: NEGATIVE
ANION GAP SERPL CALC-SCNC: 10 MMOL/L (ref 8–16)
AST SERPL-CCNC: 19 U/L (ref 10–40)
BARBITURATES UR QL SCN>200 NG/ML: NEGATIVE
BASOPHILS # BLD AUTO: 0.01 K/UL (ref 0–0.2)
BASOPHILS NFR BLD: 0.2 % (ref 0–1.9)
BENZODIAZ UR QL SCN>200 NG/ML: NEGATIVE
BILIRUB SERPL-MCNC: 0.3 MG/DL (ref 0.1–1)
BILIRUB UR QL STRIP: NEGATIVE
BNP SERPL-MCNC: 14 PG/ML (ref 0–99)
BUN SERPL-MCNC: 12 MG/DL (ref 6–20)
BZE UR QL SCN: NEGATIVE
CALCIUM SERPL-MCNC: 9.7 MG/DL (ref 8.7–10.5)
CANNABINOIDS UR QL SCN: NORMAL
CHLORIDE SERPL-SCNC: 109 MMOL/L (ref 95–110)
CK SERPL-CCNC: 215 U/L (ref 20–200)
CK SERPL-CCNC: 238 U/L (ref 20–200)
CLARITY UR: CLEAR
CO2 SERPL-SCNC: 24 MMOL/L (ref 23–29)
COLOR UR: YELLOW
CREAT SERPL-MCNC: 1 MG/DL (ref 0.5–1.4)
CREAT UR-MCNC: 217.2 MG/DL (ref 23–375)
DIFFERENTIAL METHOD: ABNORMAL
EOSINOPHIL # BLD AUTO: 0.1 K/UL (ref 0–0.5)
EOSINOPHIL NFR BLD: 2 % (ref 0–8)
ERYTHROCYTE [DISTWIDTH] IN BLOOD BY AUTOMATED COUNT: 12.2 % (ref 11.5–14.5)
EST. GFR  (AFRICAN AMERICAN): >60 ML/MIN/1.73 M^2
EST. GFR  (NON AFRICAN AMERICAN): >60 ML/MIN/1.73 M^2
ETHANOL SERPL-MCNC: <10 MG/DL
ETHANOL SERPL-MCNC: <10 MG/DL
GLUCOSE SERPL-MCNC: 84 MG/DL (ref 70–110)
GLUCOSE UR QL STRIP: NEGATIVE
HCT VFR BLD AUTO: 41.5 % (ref 40–54)
HGB BLD-MCNC: 13.5 G/DL (ref 14–18)
HGB UR QL STRIP: NEGATIVE
KETONES UR QL STRIP: NEGATIVE
LEUKOCYTE ESTERASE UR QL STRIP: NEGATIVE
LYMPHOCYTES # BLD AUTO: 2.9 K/UL (ref 1–4.8)
LYMPHOCYTES NFR BLD: 58.5 % (ref 18–48)
MCH RBC QN AUTO: 30.3 PG (ref 27–31)
MCHC RBC AUTO-ENTMCNC: 32.5 G/DL (ref 32–36)
MCV RBC AUTO: 93 FL (ref 82–98)
METHADONE UR QL SCN>300 NG/ML: NEGATIVE
MONOCYTES # BLD AUTO: 0.5 K/UL (ref 0.3–1)
MONOCYTES NFR BLD: 9.3 % (ref 4–15)
NEUTROPHILS # BLD AUTO: 1.5 K/UL (ref 1.8–7.7)
NEUTROPHILS NFR BLD: 30.2 % (ref 38–73)
NITRITE UR QL STRIP: NEGATIVE
OPIATES UR QL SCN: NEGATIVE
PCP UR QL SCN>25 NG/ML: NEGATIVE
PH UR STRIP: 5 [PH] (ref 5–8)
PLATELET # BLD AUTO: 216 K/UL (ref 150–350)
PMV BLD AUTO: 11 FL (ref 9.2–12.9)
POTASSIUM SERPL-SCNC: 4.4 MMOL/L (ref 3.5–5.1)
PROT SERPL-MCNC: 8.6 G/DL (ref 6–8.4)
PROT UR QL STRIP: NEGATIVE
RBC # BLD AUTO: 4.46 M/UL (ref 4.6–6.2)
SODIUM SERPL-SCNC: 143 MMOL/L (ref 136–145)
SP GR UR STRIP: 1.02 (ref 1–1.03)
TOXICOLOGY INFORMATION: NORMAL
TROPONIN I SERPL DL<=0.01 NG/ML-MCNC: <0.006 NG/ML (ref 0–0.03)
TSH SERPL DL<=0.005 MIU/L-ACNC: 3.48 UIU/ML (ref 0.4–4)
URN SPEC COLLECT METH UR: NORMAL
UROBILINOGEN UR STRIP-ACNC: NEGATIVE EU/DL
VALPROATE SERPL-MCNC: 25.2 UG/ML (ref 50–100)
WBC # BLD AUTO: 4.96 K/UL (ref 3.9–12.7)

## 2019-09-20 PROCEDURE — 96376 TX/PRO/DX INJ SAME DRUG ADON: CPT

## 2019-09-20 PROCEDURE — G0378 HOSPITAL OBSERVATION PER HR: HCPCS

## 2019-09-20 PROCEDURE — 90792 PSYCH DIAG EVAL W/MED SRVCS: CPT | Mod: ,,, | Performed by: NURSE PRACTITIONER

## 2019-09-20 PROCEDURE — 99214 OFFICE O/P EST MOD 30 MIN: CPT | Mod: ,,, | Performed by: PSYCHIATRY & NEUROLOGY

## 2019-09-20 PROCEDURE — 96375 TX/PRO/DX INJ NEW DRUG ADDON: CPT

## 2019-09-20 PROCEDURE — 63600175 PHARM REV CODE 636 W HCPCS: Performed by: EMERGENCY MEDICINE

## 2019-09-20 PROCEDURE — 63600175 PHARM REV CODE 636 W HCPCS: Performed by: NURSE PRACTITIONER

## 2019-09-20 PROCEDURE — 80053 COMPREHEN METABOLIC PANEL: CPT

## 2019-09-20 PROCEDURE — 80307 DRUG TEST PRSMV CHEM ANLYZR: CPT

## 2019-09-20 PROCEDURE — 96361 HYDRATE IV INFUSION ADD-ON: CPT

## 2019-09-20 PROCEDURE — 99214 PR OFFICE/OUTPT VISIT, EST, LEVL IV, 30-39 MIN: ICD-10-PCS | Mod: ,,, | Performed by: PSYCHIATRY & NEUROLOGY

## 2019-09-20 PROCEDURE — 84484 ASSAY OF TROPONIN QUANT: CPT

## 2019-09-20 PROCEDURE — 25000003 PHARM REV CODE 250: Performed by: PSYCHIATRY & NEUROLOGY

## 2019-09-20 PROCEDURE — 84443 ASSAY THYROID STIM HORMONE: CPT

## 2019-09-20 PROCEDURE — 80320 DRUG SCREEN QUANTALCOHOLS: CPT

## 2019-09-20 PROCEDURE — 80320 DRUG SCREEN QUANTALCOHOLS: CPT | Mod: 91

## 2019-09-20 PROCEDURE — 81003 URINALYSIS AUTO W/O SCOPE: CPT | Mod: 59

## 2019-09-20 PROCEDURE — 83880 ASSAY OF NATRIURETIC PEPTIDE: CPT

## 2019-09-20 PROCEDURE — 90792 PR PSYCHIATRIC DIAGNOSTIC EVALUATION W/MEDICAL SERVICES: ICD-10-PCS | Mod: ,,, | Performed by: NURSE PRACTITIONER

## 2019-09-20 PROCEDURE — 80203 DRUG SCREEN QUANT ZONISAMIDE: CPT

## 2019-09-20 PROCEDURE — 36415 COLL VENOUS BLD VENIPUNCTURE: CPT

## 2019-09-20 PROCEDURE — 96365 THER/PROPH/DIAG IV INF INIT: CPT

## 2019-09-20 PROCEDURE — 82550 ASSAY OF CK (CPK): CPT | Mod: 91

## 2019-09-20 PROCEDURE — 80164 ASSAY DIPROPYLACETIC ACD TOT: CPT

## 2019-09-20 PROCEDURE — 82550 ASSAY OF CK (CPK): CPT

## 2019-09-20 PROCEDURE — 85025 COMPLETE CBC W/AUTO DIFF WBC: CPT

## 2019-09-20 RX ORDER — LEVETIRACETAM 5 MG/ML
500 INJECTION INTRAVASCULAR ONCE
Status: DISCONTINUED | OUTPATIENT
Start: 2019-09-20 | End: 2019-09-20

## 2019-09-20 RX ORDER — ZONISAMIDE 100 MG/1
100 CAPSULE ORAL DAILY
Status: DISCONTINUED | OUTPATIENT
Start: 2019-09-20 | End: 2019-09-21 | Stop reason: HOSPADM

## 2019-09-20 RX ORDER — KETAMINE HYDROCHLORIDE 50 MG/ML
200 INJECTION, SOLUTION INTRAMUSCULAR; INTRAVENOUS
Status: DISPENSED | OUTPATIENT
Start: 2019-09-20 | End: 2019-09-20

## 2019-09-20 RX ORDER — LORAZEPAM 2 MG/ML
2 INJECTION INTRAMUSCULAR
Status: COMPLETED | OUTPATIENT
Start: 2019-09-20 | End: 2019-09-20

## 2019-09-20 RX ORDER — SODIUM CHLORIDE 9 MG/ML
INJECTION, SOLUTION INTRAVENOUS CONTINUOUS
Status: DISCONTINUED | OUTPATIENT
Start: 2019-09-20 | End: 2019-09-21

## 2019-09-20 RX ORDER — ONDANSETRON 2 MG/ML
4 INJECTION INTRAMUSCULAR; INTRAVENOUS EVERY 4 HOURS PRN
Status: DISCONTINUED | OUTPATIENT
Start: 2019-09-20 | End: 2019-09-21 | Stop reason: HOSPADM

## 2019-09-20 RX ORDER — FAMOTIDINE 20 MG/1
20 TABLET, FILM COATED ORAL DAILY
Status: DISCONTINUED | OUTPATIENT
Start: 2019-09-20 | End: 2019-09-21 | Stop reason: HOSPADM

## 2019-09-20 RX ORDER — LORAZEPAM 2 MG/ML
2 INJECTION INTRAMUSCULAR EVERY 4 HOURS PRN
Status: DISCONTINUED | OUTPATIENT
Start: 2019-09-20 | End: 2019-09-21 | Stop reason: HOSPADM

## 2019-09-20 RX ORDER — DIVALPROEX SODIUM 500 MG/1
2500 TABLET, FILM COATED, EXTENDED RELEASE ORAL DAILY
Status: DISCONTINUED | OUTPATIENT
Start: 2019-09-20 | End: 2019-09-20

## 2019-09-20 RX ORDER — SODIUM CHLORIDE 0.9 % (FLUSH) 0.9 %
10 SYRINGE (ML) INJECTION
Status: DISCONTINUED | OUTPATIENT
Start: 2019-09-20 | End: 2019-09-21 | Stop reason: HOSPADM

## 2019-09-20 RX ORDER — FENOFIBRATE 160 MG/1
160 TABLET ORAL DAILY
Status: DISCONTINUED | OUTPATIENT
Start: 2019-09-20 | End: 2019-09-21 | Stop reason: HOSPADM

## 2019-09-20 RX ORDER — ACETAMINOPHEN 325 MG/1
650 TABLET ORAL EVERY 8 HOURS PRN
Status: DISCONTINUED | OUTPATIENT
Start: 2019-09-20 | End: 2019-09-21 | Stop reason: HOSPADM

## 2019-09-20 RX ORDER — POLYETHYLENE GLYCOL 3350 17 G/17G
17 POWDER, FOR SOLUTION ORAL DAILY
Status: DISCONTINUED | OUTPATIENT
Start: 2019-09-20 | End: 2019-09-21 | Stop reason: HOSPADM

## 2019-09-20 RX ORDER — DIVALPROEX SODIUM 500 MG/1
2000 TABLET, FILM COATED, EXTENDED RELEASE ORAL DAILY
Status: DISCONTINUED | OUTPATIENT
Start: 2019-09-20 | End: 2019-09-21 | Stop reason: HOSPADM

## 2019-09-20 RX ADMIN — SODIUM CHLORIDE: 0.9 INJECTION, SOLUTION INTRAVENOUS at 09:09

## 2019-09-20 RX ADMIN — LEVETIRACETAM 500 MG: 5 INJECTION INTRAVENOUS at 11:09

## 2019-09-20 RX ADMIN — LORAZEPAM 2 MG: 2 INJECTION INTRAMUSCULAR; INTRAVENOUS at 05:09

## 2019-09-20 RX ADMIN — LEVETIRACETAM 2000 MG: 100 INJECTION, SOLUTION INTRAVENOUS at 04:09

## 2019-09-20 RX ADMIN — LORAZEPAM 2 MG: 2 INJECTION INTRAMUSCULAR; INTRAVENOUS at 04:09

## 2019-09-20 RX ADMIN — ZONISAMIDE 100 MG: 100 CAPSULE ORAL at 05:09

## 2019-09-20 RX ADMIN — DIVALPROEX SODIUM 2000 MG: 500 TABLET, FILM COATED, EXTENDED RELEASE ORAL at 05:09

## 2019-09-20 RX ADMIN — SODIUM CHLORIDE: 0.9 INJECTION, SOLUTION INTRAVENOUS at 05:09

## 2019-09-20 NOTE — H&P
"[facility] Hospital Medicine  History & Physical    Patient Name: Teodora Sanchez  MRN: [id number]  Admission Date: 9/19/2019  Attending Physician: Manolo Barton MD   Primary Care Provider: Shayna Dubose MD         Patient information was obtained from patient and ER records.     Subjective:     Principal Problem:Recurrent seizures    Chief Complaint:   Chief Complaint   Patient presents with    Seizures     Pt reports he had a seizure episode today while lying in his bed around 5pm (unwitnessed). Pt neighbor alerted EMS. Pt is unable to recall event-pt brought to ED by his uncle for eval.  Pt reports noncompliant with his prescribed seizure med regimen (Depakote). Pt reports his last sz episode prior to today was last week.         HPI: Mr. Sanchez is a 19 yo male with history seizure since age 2 and chronic non compliance with seizure medication (depakote and zonisamide) who found by Neighbor's Son with seizure activity. Patient unable to provide history at this time.  Spoke with Mom via phone who reports patient is followed by Saloni MCKENZIE Woodruff (last seen 9/5).  Mother reports picked patient up from working 12 hr night shift at 630 am (Works as  at [employer]). At 5 pm mom said patient did not  phone which prompt Aunt Tootie to go check up on patient at which point patient reported not feeling well so did not want to go to work. Mom reports initial seizure presentation were stares but in high school presentation worsen bith body jerks. Mom reports seizure very frequent with most recent 9/5 and 9/7 "comes out of it" post ictal (disoriented) about 30 minutes.  Smokes marijuana. Mom not aware of alcohol or use of illicit drug use.     X ray shoulder no acute fracture. CT cervical no displaced fracture. Drug screen positive for THC. Alcohol level normal. Valporic and Zonisamide level pending.   In ED, patient had witnessed seizure at 4am? And received " total 4 mg lorazepam. IV keppra 2000 g given in ED.       Primary Neurologist checked level Valporic acid and Zonisamide on9/5  which both were non therapeutic range.     Past Medical History:   Diagnosis Date    Asthma     Seizures        Past Surgical History:   Procedure Laterality Date    CYST REMOVAL      leg       Review of patient's allergies indicates:  No Known Allergies    No current facility-administered medications on file prior to encounter.      Current Outpatient Medications on File Prior to Encounter   Medication Sig    divalproex ER (DEPAKOTE) 500 MG Tb24 Take 5 tablets (2,500 mg total) by mouth once daily.    fenofibrate 160 MG Tab Take 1 tablet (160 mg total) by mouth once daily.    albuterol (ACCUNEB) 1.25 mg/3 mL Nebu Take 1.25 mg by nebulization every 6 (six) hours as needed.    ketoconazole (NIZORAL) 2 % cream Apply topically once daily.     Family History     Problem Relation (Age of Onset)    Hyperlipidemia Mother    No Known Problems Father        Tobacco Use    Smoking status: Never Smoker    Smokeless tobacco: Never Used   Substance and Sexual Activity    Alcohol use: No     Frequency: Never     Drinks per session: Patient refused     Binge frequency: Never    Drug use: No    Sexual activity: Not on file     Review of Systems   Unable to perform ROS: Mental status change     Objective:     Vital Signs (Most Recent):  Temp: 98 °F (36.7 °C) (09/20/19 0809)  Pulse: (!) 54 (09/20/19 0809)  Resp: 18 (09/20/19 0809)  BP: 117/62 (09/20/19 0809)  SpO2: 98 % (09/20/19 0809) Vital Signs (24h Range):  Temp:  [97.8 °F (36.6 °C)-98.9 °F (37.2 °C)] 98 °F (36.7 °C)  Pulse:  [] 54  Resp:  [18-20] 18  SpO2:  [98 %-100 %] 98 %  BP: (111-146)/(56-83) 117/62     Weight: 88.6 kg (195 lb 5.2 oz)  Body mass index is 30.59 kg/m².    Physical Exam   Constitutional: He appears well-developed and well-nourished.   Lethargic, post ictal stage   Neck: Neck supple.   Cardiovascular: Normal rate and  regular rhythm.   Pulmonary/Chest: Effort normal and breath sounds normal. No respiratory distress.   Abdominal: Soft. Bowel sounds are normal. He exhibits no distension.   Musculoskeletal: He exhibits no edema.   Neurological:   Able to stick out tongue and squeeze hand on command    Skin: Skin is warm and dry.           Significant Labs: All pertinent labs within the past 24 hours have been reviewed.    Significant Imaging: I have reviewed and interpreted all pertinent imaging results/findings within the past 24 hours.    Assessment/Plan:     * Recurrent seizures  See HP above. Had one seizure PTA and another episode in ED. Keppra 2 g IV and ativan 4 mg in ED.   Followed by Neurology Dr. 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  CT hed no acute intracranial abnormality  Currently post ictal ?   Will call pharm to get dose of Zonisamide  Seizure/Aspiration precaution, Tele  Follow up with Neurology further recommendations.     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   Consult Psychiatry             VTE Risk Mitigation (From admission, onward)        Ordered     IP VTE HIGH RISK PATIENT  Once      09/20/19 0621     Place MADISON hose  Until discontinued      09/20/19 0621     Reason for No Pharmacological VTE Prophylaxis  Once      09/20/19 0621     Place sequential compression device  Until discontinued      09/20/19 0621             Kizzy Velazquez NP  Department of Hospital Medicine   [facility]

## 2019-09-20 NOTE — SUBJECTIVE & OBJECTIVE
Past Medical History:   Diagnosis Date    Asthma     Seizures        Past Surgical History:   Procedure Laterality Date    CYST REMOVAL      leg       Review of patient's allergies indicates:  No Known Allergies    No current facility-administered medications on file prior to encounter.      Current Outpatient Medications on File Prior to Encounter   Medication Sig    divalproex ER (DEPAKOTE) 500 MG Tb24 Take 5 tablets (2,500 mg total) by mouth once daily.    fenofibrate 160 MG Tab Take 1 tablet (160 mg total) by mouth once daily.    albuterol (ACCUNEB) 1.25 mg/3 mL Nebu Take 1.25 mg by nebulization every 6 (six) hours as needed.    ketoconazole (NIZORAL) 2 % cream Apply topically once daily.     Family History     Problem Relation (Age of Onset)    Hyperlipidemia Mother    No Known Problems Father        Tobacco Use    Smoking status: Never Smoker    Smokeless tobacco: Never Used   Substance and Sexual Activity    Alcohol use: No     Frequency: Never     Drinks per session: Patient refused     Binge frequency: Never    Drug use: No    Sexual activity: Not on file     Review of Systems   Unable to perform ROS: Mental status change     Objective:     Vital Signs (Most Recent):  Temp: 98 °F (36.7 °C) (09/20/19 0809)  Pulse: (!) 54 (09/20/19 0809)  Resp: 18 (09/20/19 0809)  BP: 117/62 (09/20/19 0809)  SpO2: 98 % (09/20/19 0809) Vital Signs (24h Range):  Temp:  [97.8 °F (36.6 °C)-98.9 °F (37.2 °C)] 98 °F (36.7 °C)  Pulse:  [] 54  Resp:  [18-20] 18  SpO2:  [98 %-100 %] 98 %  BP: (111-146)/(56-83) 117/62     Weight: 88.6 kg (195 lb 5.2 oz)  Body mass index is 30.59 kg/m².    Physical Exam   Constitutional: He appears well-developed and well-nourished.   Lethargic, post ictal stage   Neck: Neck supple.   Cardiovascular: Normal rate and regular rhythm.   Pulmonary/Chest: Effort normal and breath sounds normal. No respiratory distress.   Abdominal: Soft. Bowel sounds are normal. He exhibits no distension.    Musculoskeletal: He exhibits no edema.   Neurological:   Able to stick out tongue and squeeze hand on command    Skin: Skin is warm and dry.           Significant Labs: All pertinent labs within the past 24 hours have been reviewed.    Significant Imaging: I have reviewed and interpreted all pertinent imaging results/findings within the past 24 hours.

## 2019-09-20 NOTE — HOSPITAL COURSE
Teodora Sanchez presents lying supine in bed with the head of bed up, wearing hospital attire. His eyes are closed and he squeezes them tighter when his name is called. He opens his eyes multiple times when this provider converses with his mother. Upon seeing this practitioner looking at him, he closes his eyes again. Initially his mother and aunt leave the room for the evaluation. His mother reenters after the patient refuses to speak or open his eyes. Therefore most of the information for this assessment is taken from the chart and the mother. His mother Dakota reports that her son as been sending her text messages about his displeasure with life. She shows one of the messages in which he calls her disrespectful and says that life can go to hell. His mother further reports that he has told her that he does not take his anti-seizure medication so that he can have a seizure and die. His mother's voice is shaky as she reads the text message from him and he does open his eyes for about 20 seconds at this point. He is asked if he is trying to kill himself and shrugs his shoulders. He is asked if he can promise that he will not kill himself and he does not answer or move his shoulders, but closes his eyes. Based on his behavior and inability to indicate that he can contract for safety, this patient is suicidal.    His overall symptom complex includes: sad affect, refusal to engage, shrugs shoulders when asked if he is trying to kill himself, inability to contract for safety, text message to mom stating displeasure with life, verbalization to mother that he does not take anti-seizure medication so that he can have a seizure and die, positive THCA.

## 2019-09-20 NOTE — HPI
"Mr. Sanchez is a 17 yo male with history seizure since age 2 and chronic non compliance with seizure medication (depakote and zonisamide) who found by Neighbor's Son with seizure activity. Patient unable to provide history at this time.  Spoke with Mom via phone who reports patient is followed by [facility] Dr. Charles Woodruff (last seen 9/5).  Mother reports picked patient up from working 12 hr night shift at 630 am (Works as  at [employer]). At 5 pm mom said patient did not  phone which prompt Aunt Tootie to go check up on patient at which point patient reported not feeling well so did not want to go to work. Mom reports initial seizure presentation were stares but in high school presentation worsen bith body jerks. Mom reports seizure very frequent with most recent 9/5 and 9/7 "comes out of it" post ictal (disoriented) about 30 minutes.  Smokes marijuana. Mom not aware of alcohol or use of illicit drug use.     X ray shoulder no acute fracture. CT cervical no displaced fracture. Drug screen positive for THC. Alcohol level normal. Valporic and Zonisamide level pending.   In ED, patient had witnessed seizure at 4am? And received total 4 mg lorazepam. IV keppra 2000 g given in ED.       Primary Neurologist checked level Valporic acid and Zonisamide on9/5  which both were non therapeutic range.   "

## 2019-09-20 NOTE — ED NOTES
Patient seen lying on floor having a seizure. Patient unable to respond. Nurse placed patient on oxygen and with assistance place patient into bed.

## 2019-09-20 NOTE — ASSESSMENT & PLAN NOTE
See HP above. Had one seizure PTA and another episode in ED. Keppra 2 g IV and ativan 4 mg in ED.   Followed by Neurology Dr. Charles Woodruff in [place] and last seen 9/5/19 and valporic and zonisamide level non therapeutic  Patient with history of chronic non compliance  2018 MRI brainSlight smaller caliber left mesial temporal lobe with subtle corresponding FLAIR hyperintensity within the hippocampus concerning for left mesial temporal sclerosis.  CT hed no acute intracranial abnormality  Currently post ictal ?   Obtain EEG, Given another keppra 500 mg then 2500 Mg IV until able to take oral  Will call pharm to get dose of Zonisamide  Seizure/Aspiration precaution, Tele  Follow up with Neurology further recommendations.

## 2019-09-20 NOTE — ED NOTES
Md called to ct scan room. Patient unable to answer questions but periodically kicking and screaming. Nurse attempting to console patient.

## 2019-09-20 NOTE — CONSULTS
[facility]  Neurology  Consult Note    Patient Name: Teodora Sanchez  MRN: [id number]  Admission Date: 9/19/2019  Hospital Length of Stay: 0 days  Code Status: Full Code   Attending Provider: Manolo Barton MD   Consulting Provider: Romero Rubio MD  Primary Care Physician: Shayna Dubose MD  Principal Problem:Recurrent seizures    Inpatient consult to Neurology  Consult performed by: Romero Rubio MD  Consult ordered by: Jamai Sanchez MD        Subjective:     Chief Complaint: Seizures     HPI: 17 y/o male with medical Hx as listed below was brought to ED after having a seizure. Upon arrival to ED pt had another of his seziures. He was given lorazepam. Pt has Hx of seizures since childhood (abscence). He is known to not be adherent to Rx which includes zonisamide 100 mg daily and VPA ER 2500 mg daily. His mother is providing Hx as pt is sleeping and wakes up for brief periods of time. She is concerned with his behavior as he refuses to take medications and has actually told her that he does not take his AED's so he can have a seizure and die. When is taking his meds pt may have 1-2 episodes per month; when he is not may be 10 or more per month.      Pt has left medial temporal sclerosis. Abnormal EEG's.    Past Medical History:   Diagnosis Date    Asthma     Seizures        Past Surgical History:   Procedure Laterality Date    CYST REMOVAL      leg       Review of patient's allergies indicates:  No Known Allergies    Current Neurological Medications:     No current facility-administered medications on file prior to encounter.      Current Outpatient Medications on File Prior to Encounter   Medication Sig    divalproex ER (DEPAKOTE) 500 MG Tb24 Take 5 tablets (2,500 mg total) by mouth once daily.    fenofibrate 160 MG Tab Take 1 tablet (160 mg total) by mouth once daily.    albuterol (ACCUNEB) 1.25 mg/3 mL Nebu Take 1.25 mg by nebulization every 6 (six) hours as needed.     ketoconazole (NIZORAL) 2 % cream Apply topically once daily.      Family History     Problem Relation (Age of Onset)    Hyperlipidemia Mother    No Known Problems Father        Tobacco Use    Smoking status: Never Smoker    Smokeless tobacco: Never Used   Substance and Sexual Activity    Alcohol use: No     Frequency: Never     Drinks per session: Patient refused     Binge frequency: Never    Drug use: No    Sexual activity: Not on file     Review of Systems   Pt not answering questions    Objective:     Vital Signs (Most Recent):  Temp: 97.7 °F (36.5 °C) (09/20/19 1200)  Pulse: 62 (09/20/19 1200)  Resp: 17 (09/20/19 1200)  BP: 122/88 (09/20/19 1200)  SpO2: 100 % (09/20/19 1200) Vital Signs (24h Range):  Temp:  [97.7 °F (36.5 °C)-98.9 °F (37.2 °C)] 97.7 °F (36.5 °C)  Pulse:  [] 62  Resp:  [17-20] 17  SpO2:  [98 %-100 %] 100 %  BP: (111-146)/(56-88) 122/88     Weight: 88.6 kg (195 lb 5.2 oz)  Body mass index is 30.59 kg/m².    Physical Exam   Constitutional: No distress.   HENT:   Head: Normocephalic.   Eyes: Right eye exhibits no discharge. Left eye exhibits no discharge.   Neck: Normal range of motion.   Cardiovascular: Regular rhythm.   Pulmonary/Chest: Breath sounds normal.   Abdominal: Bowel sounds are normal.   Musculoskeletal: He exhibits no edema.   Skin: He is not diaphoretic.       NEUROLOGICAL EXAMINATION:     MENTAL STATUS        Wakes up briefly but doesn't participate     CRANIAL NERVES     CN III, IV, VI   Right pupil: Size: 3 mm. Shape: regular.   Left pupil: Size: 3 mm. Shape: regular.   Nystagmus: none   Ophthalmoparesis: none    CN VII   Right facial weakness: none  Left facial weakness: none    MOTOR EXAM        Moves four extremities against gravity     GAIT AND COORDINATION     Tremor   Resting tremor: absent      Significant Labs:   CBC:   Recent Labs   Lab 09/20/19 0036   WBC 4.96   HGB 13.5*   HCT 41.5        CMP:   Recent Labs   Lab 09/20/19 0036   GLU 84      K 4.4       CO2 24   BUN 12   CREATININE 1.0   CALCIUM 9.7   PROT 8.6*   ALBUMIN 4.6   BILITOT 0.3   ALKPHOS 93   AST 19   ALT 20   ANIONGAP 10   EGFRNONAA >60       Significant Imaging:   Head CT  Impression       Motion limited examination.  No CT evidence of acute intracranial abnormality. Clinical correlation and further evaluation as warranted.      Electronically signed by: Anatoly Barrett MD  Date: 09/20/2019  Time: 06:02         Assessment and Plan:     19 y/o male consulted for seizures    1. Seizure: Hx of epilepsy. Pt is not adherent to Rx.   -Rsume VPA ER 2000 mg daily. Zonisamide 100 mg daily.   -Psych evaluations.   -Seizure restrictions are but not limited to: no driving for six months after last seizure; avoid swimming, high altitude activities, operating heavy machinery, bathing unattended, or engaging in activities in where a seizure will cause harm to self or others.    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 I will follow-up with patient. Please contact us if you have any additional questions.    Romero Rubio MD  Neurology  [facility]

## 2019-09-20 NOTE — NURSING
PEC called in to Kady at [facility].  Faxed to [phone] at [phone].

## 2019-09-20 NOTE — ASSESSMENT & PLAN NOTE
His overall symptom complex includes: sad affect, refusal to engage, shrugs shoulders when asked if he is trying to kill himself, inability to contract for safety, text message to mom stating displeasure with life, verbalization to mother that he does not take anti-seizure medication so that he can have a seizure and die, positive THCA. Teodora Sanchez is suicidal.    PEC completed at 3:14 P.M. Start 1: 1 sitter. Notify  of PEC status. Inpatient Psychiatric facility once medically cleared. Upon transfer, please send original PEC/CEC with patient and place copy on the chart. Utilize Zyprexa 10 mg IM q 8 hours prn agitation or uncontrollable threatening behavior.

## 2019-09-20 NOTE — NURSING
Report rec'd from DIANE Rahman in ED. Patient had seizures today. AAOx4. Fell prior to DC from ED, c/o L shoulder pain. Admit to OBS rm 333B. Await transport.

## 2019-09-20 NOTE — ED PROVIDER NOTES
Encounter Date: 9/19/2019  SORT:   19 y/o male with history of seizures and medication non-compliance presenting for evaluation of seizure that occurred prior to arrival. Witnessed by someone else. Does not take Depakote as prescribed. Last dose 2 days ago. Initial orders placed. MAGALIE Valiente PA-C     History     Chief Complaint   Patient presents with    Seizures     Pt reports he had a seizure episode today while lying in his bed around 5pm (unwitnessed). Pt neighbor alerted EMS. Pt is unable to recall event-pt brought to ED by his uncle for eval.  Pt reports noncompliant with his prescribed seizure med regimen (Depakote). Pt reports his last sz episode prior to today was last week.      18 y.o. Male with seizures, chronically noncompliant, presents via EMS for seizure PTA. Patient's family member called paramedics after patient had seizure in bed.     History limited as patient had repeat seizure in ED.     Uncle Isaac Sanchez arrived later. He reports that patient has had seizures since high school. His seizure disorder was better-controlled in the past; patient went away to college to [place] but seizures were so bad that he could not finish his schooling. At present, patient is  at [employer]. He did not report for work today, so patient's mother called uncle to check on him. Uncle found patient in bed postictal. No evidence of trauma. Uncle reports that patient told him he had not taken seizure meds in two days. Uncle suspects this is why patient's seizures were better in high school than now.    PMH: asthma, seizures    PSH: cyst removal    Uncle Isaac Sanchez: [phone]        Review of patient's allergies indicates:  No Known Allergies  Past Medical History:   Diagnosis Date    Asthma     Psychiatric problem     Seizures      Past Surgical History:   Procedure Laterality Date    CYST REMOVAL      leg     Family History   Problem Relation Age of Onset    Hyperlipidemia Mother     No Known  Problems Father      Social History     Tobacco Use    Smoking status: Never Smoker    Smokeless tobacco: Never Used   Substance Use Topics    Alcohol use: Yes     Frequency: Never     Drinks per session: Patient refused     Binge frequency: Never    Drug use: Yes     Types: Marijuana     Review of Systems   Unable to perform ROS: Mental status change   Musculoskeletal: Positive for arthralgias (left shoulder chronic pain from earlier seizure).   Neurological: Positive for seizures.   Psychiatric/Behavioral: Positive for confusion (postictal).       Physical Exam     Initial Vitals [09/19/19 2111]   BP Pulse Resp Temp SpO2   127/65 74 20 98 °F (36.7 °C) 100 %      MAP       --         Physical Exam    Nursing note and vitals reviewed.  Constitutional: He appears well-developed and well-nourished.   Patient lying on ED floor, seizing, then postictal.   HENT:   Head: Normocephalic.   Tongue bite.    Eyes: Conjunctivae are normal.   Bizarre eye movements c/w seizure.   Neck: Normal range of motion. Neck supple.   Cardiovascular: Normal rate, regular rhythm and intact distal pulses.   Pulmonary/Chest: Breath sounds normal. No respiratory distress. He has no wheezes. He has no rhonchi. He has no rales.   Abdominal: Soft. He exhibits no distension. There is no tenderness.   Musculoskeletal: Normal range of motion. He exhibits no edema or tenderness.   Neurological: He is alert. He has normal strength.   Moving all extremities. Postictal.   Skin: Skin is warm and dry.   Psychiatric:   Postictal.         ED Course   Procedures  Labs Reviewed   VALPROIC ACID - Abnormal; Notable for the following components:       Result Value    Valproic Acid Level 25.2 (*)     All other components within normal limits   COMPREHENSIVE METABOLIC PANEL - Abnormal; Notable for the following components:    Total Protein 8.6 (*)     All other components within normal limits   CBC W/ AUTO DIFFERENTIAL - Abnormal; Notable for the following  components:    RBC 4.46 (*)     Hemoglobin 13.5 (*)     Gran # (ANC) 1.5 (*)     Gran% 30.2 (*)     Lymph% 58.5 (*)     All other components within normal limits   ALCOHOL,MEDICAL (ETHANOL)   B-TYPE NATRIURETIC PEPTIDE   TSH   TROPONIN I   URINALYSIS, REFLEX TO URINE CULTURE    Narrative:     Preferred Collection Type->Urine, Clean Catch   DRUG SCREEN PANEL, URINE EMERGENCY    Narrative:     Preferred Collection Type->Urine, Clean Catch   ZONISAMIDE LEVEL   POCT GLUCOSE MONITORING CONTINUOUS     EKG Readings: (Independently Interpreted)   23:48: Sinus lele, HR 53. No ectopy. TWI in III. No STEMI.        Imaging Results          X-Ray Shoulder Trauma Left (Final result)  Result time 09/20/19 06:24:04    Final result by Anatoly Barrett MD (09/20/19 06:24:04)                 Impression:      As above.      Electronically signed by: Anatoly Barrett MD  Date:    09/20/2019  Time:    06:24             Narrative:    EXAMINATION:  XR SHOULDER TRAUMA 3 VIEW LEFT    CLINICAL HISTORY:  Unspecified fall, initial encounter    TECHNIQUE:  Three views of the left shoulder were performed.    COMPARISON  Left shoulder radiograph series 03/01/2019    FINDINGS:  No definite evidence of acute displaced fracture or dislocation.  There is a chronic deformity of the inferior glenoid.  There is mild acromioclavicular degenerative osteoarthrosis.                               CT Cervical Spine Without Contrast (Final result)  Result time 09/20/19 06:06:34    Final result by Anatoly Barrett MD (09/20/19 06:06:34)                 Impression:      Severely limited examination secondary to extensive patient motion artifact, noting the cervical spine is only well assessed to the level of the C5 vertebral body.  No definite displaced fracture identified from C1 through C5.  Repeat evaluation as clinically warranted.      Electronically signed by: Anatoly Barrett MD  Date:    09/20/2019  Time:    06:06             Narrative:     EXAMINATION:  CT CERVICAL SPINE WITHOUT CONTRAST    CLINICAL HISTORY:  fall;    TECHNIQUE:  Low dose axial images, sagittal and coronal reformations were performed though the cervical spine.  Contrast was not administered.    COMPARISON:  None    FINDINGS:  Please note examination is severely limited secondary to patient motion artifact.  The cervical spine is only well evaluated to the level of the C5 vertebral body.  No definite acute displaced fracture identified of C1 through C5.  No evidence of associated bony spinal canal stenosis.    The trachea appears grossly patent.  The lung apices are unremarkable.                               CT Head Without Contrast (Final result)  Result time 09/20/19 06:02:25    Final result by Anatoly Barrett MD (09/20/19 06:02:25)                 Impression:      Motion limited examination.  No CT evidence of acute intracranial abnormality. Clinical correlation and further evaluation as warranted.      Electronically signed by: Anatoly Barrett MD  Date:    09/20/2019  Time:    06:02             Narrative:    EXAMINATION:  CT HEAD WITHOUT CONTRAST    CLINICAL HISTORY:  Seizures new or progressive;    TECHNIQUE:  Low dose axial images were obtained through the head.  Coronal and sagittal reformations were also performed. Contrast was not administered.    COMPARISON:  MRI brain 06/11/2018    FINDINGS:  Please note image quality is degraded secondary to patient motion artifact.  There is no evidence of acute intracranial hemorrhage, midline shift, or extra-axial fluid collection.  Gray-white matter differentiation appears maintained. The basal cisterns are patent. The mastoid air cells and paranasal sinuses are clear of acute process. The visualized bones of the calvarium demonstrate no acute osseous abnormality.                               X-Ray Chest AP Portable (Final result)  Result time 09/20/19 01:33:09    Final result by Anatoly Barrett MD (09/20/19 01:33:09)                  Impression:      No radiographic evidence of acute intrathoracic process.      Electronically signed by: Anatoly Barrett MD  Date:    09/20/2019  Time:    01:33             Narrative:    EXAMINATION:  XR CHEST AP PORTABLE    CLINICAL HISTORY:  Unspecified convulsions    TECHNIQUE:  Single frontal view of the chest was performed.    COMPARISON:  03/08/2019    FINDINGS:  Cardiac monitoring leads overlie the chest.  Cardiomediastinal silhouette appears within normal limits.  The lungs are symmetrically expanded without evidence of confluent airspace consolidation.  There is no large pleural effusion or pneumothorax.  The visualized osseous structures appear intact.                                 Medical Decision Making:   History:   Old Medical Records: I decided to obtain old medical records.  Old Records Summarized: records from previous admission(s).  Initial Assessment:   18 y.o. Male s/p seizure. History of seizure. Had additional seizure in ED.  Differential Diagnosis:   Differential diagnosis includes underlying seizure disorder, medication noncompliance, toxidrome, withdrawal, metabolic derangement, arrhythmia, intracranial hemorrhage, CVA, intracranial metastatic disease or other space-occupying lesion, other.  Independently Interpreted Test(s):   I have ordered and independently interpreted X-rays - see prior notes.  I have ordered and independently interpreted EKG Reading(s) - see prior notes  Clinical Tests:   Lab Tests: Ordered and Reviewed  Radiological Study: Ordered and Reviewed  Medical Tests: Ordered and Reviewed  ED Management:  CXR NAD.    EKG no STEMI. No dysrhythmia.    Labs reassuring.     Patient had returned to near-baseline when he had an additional seizure in the ED and was discovered on the floor next to his stretcher having fallen. After this, we gave ativan 2mg IV and an additional ativan 2mg IV for sedation to allow for imaging.  Patient also had keppra load.     CT head and cspine  limited by motion artifact but no acute process.    L shoulder XR NAD.    Patient still confused, not at baseline, likely postictal + sedation. Place in OBS for neuro checks, neuro eval. I discussed this case with Dr. Carcamo and have written courtesy orders.     Uncle Isaac Sanchez: [phone]  Other:   I have discussed this case with another health care provider.                      Clinical Impression:       ICD-10-CM ICD-9-CM   1. Recurrent seizures G40.909 345.80   2. Seizure R56.9 780.39   3. Fall W19.XXXA E888.9   4. Acute pain of left shoulder M25.512 719.41   5. Postictal psychosis F06.8 345.90     294.10   6. Major depressive disorder, severe F32.2 296.23                                Jamia Sanchez MD  09/20/19 1932

## 2019-09-20 NOTE — SUBJECTIVE & OBJECTIVE
Patient History           Medical as of 9/20/2019     Past Medical History     Diagnosis Date Comments Source    Asthma -- -- Provider    Psychiatric problem -- -- Provider    Seizures -- -- Provider          Pertinent Negatives     Diagnosis Date Noted Comments Source    Diabetes mellitus 02/18/2013 -- Provider    History of psychiatric hospitalization 09/20/2019 -- Provider    Hx of psychiatric care 09/20/2019 -- Provider    Therapy 09/20/2019 -- Provider                  Surgical as of 9/20/2019     Past Surgical History     Procedure Laterality Date Comments Source    CYST REMOVAL -- -- leg Provider                  Family as of 9/20/2019     Problem Relation Name Age of Onset Comments Source    Hyperlipidemia Mother -- -- -- Provider    No Known Problems Father -- -- -- Provider            Tobacco Use as of 9/20/2019     Smoking Status Smoking Start Date Smoking Quit Date Packs/Day Years Used    Never Smoker -- -- -- --    Types Comments Smokeless Tobacco Status Smokeless Tobacco Quit Date Source    -- -- Never Used -- Provider            Alcohol Use as of 9/20/2019     Alcohol Use Drinks/Week Alcohol/Week Comments Source    Yes -- -- -- Provider, Proxy User    Frequency Standard Drinks Binge Drinking        Never  Patient refused  Never               Drug Use as of 9/20/2019     Drug Use Types Frequency Comments Source    Yes  Marijuana -- -- Provider            Sexual Activity as of 9/20/2019     Sexually Active Birth Control Partners Comments Source    -- -- -- -- Provider            Activities of Daily Living as of 9/20/2019     Activities of Daily Living Question Response Comments Source    Patient feels they ought to cut down on drinking/drug use Not Asked -- Provider    Patient annoyed by others criticizing their drinking/drug use Not Asked -- Provider    Patient has felt bad or guilty about drinking/drug use Not Asked -- Provider    Patient has had a drink/used drugs as an eye opener in the AM Not  Asked -- Provider            Social Documentation as of 9/20/2019    Recently moved out of mom's home.  Never .  No children.   Source: Provider           Occupational as of 9/20/2019    None           Socioeconomic as of 9/20/2019     Marital Status Spouse Name Number of Children Years Education Education Level Preferred Language Ethnicity Race Source    Single -- -- -- -- English /Black Black or  Provider    Financial Resource Strain Food Insecurity: Worry Food Insecurity: Inability Transportation Needs: Medical Transportation Needs: Non-medical    Very hard  Never true  Never true  No  No             Pertinent History     Question Response Comments    Lives with -- recently moved out of mother's home    Place in Birth Order -- --    Lives in home --    Number of Siblings -- --    Raised by -- --    Legal Involvement -- --    Childhood Trauma -- --    Criminal History of -- --    Financial Status -- --    Highest Level of Education -- --    Does patient have access to a firearm? -- --     Service -- --    Primary Leisure Activity -- --    Spirituality -- --        Past Medical History:   Diagnosis Date    Asthma     Psychiatric problem     Seizures      Past Surgical History:   Procedure Laterality Date    CYST REMOVAL      leg     Family History     Problem Relation (Age of Onset)    Hyperlipidemia Mother    No Known Problems Father        Tobacco Use    Smoking status: Never Smoker    Smokeless tobacco: Never Used   Substance and Sexual Activity    Alcohol use: Yes     Frequency: Never     Drinks per session: Patient refused     Binge frequency: Never    Drug use: Yes     Types: Marijuana    Sexual activity: Not on file     Review of patient's allergies indicates:  No Known Allergies    No current facility-administered medications on file prior to encounter.      Current Outpatient Medications on File Prior to Encounter   Medication Sig    divalproex ER  "(DEPAKOTE) 500 MG Tb24 Take 5 tablets (2,500 mg total) by mouth once daily.    fenofibrate 160 MG Tab Take 1 tablet (160 mg total) by mouth once daily.    albuterol (ACCUNEB) 1.25 mg/3 mL Nebu Take 1.25 mg by nebulization every 6 (six) hours as needed.    ketoconazole (NIZORAL) 2 % cream Apply topically once daily.     Psychotherapeutics (From admission, onward)    Start     Stop Route Frequency Ordered    09/20/19 0641  lorazepam injection 2 mg      -- IV Every 4 hours PRN 09/20/19 0621        Review of Systems   Unable to perform ROS: Other (patient refuses)     Strengths and Liabilities: Strength: Patient has positive support network., Liability: uncooperative with interview    Objective:     Vital Signs (Most Recent):  Temp: 97.7 °F (36.5 °C) (09/20/19 1200)  Pulse: 62 (09/20/19 1200)  Resp: 17 (09/20/19 1200)  BP: 122/88 (09/20/19 1200)  SpO2: 100 % (09/20/19 1200) Vital Signs (24h Range):  Temp:  [97.7 °F (36.5 °C)-98.9 °F (37.2 °C)] 97.7 °F (36.5 °C)  Pulse:  [] 62  Resp:  [17-20] 17  SpO2:  [98 %-100 %] 100 %  BP: (111-146)/(56-88) 122/88     Height: 5' 7" (170.2 cm)  Weight: 88.6 kg (195 lb 5.2 oz)  Body mass index is 30.59 kg/m².    No intake or output data in the 24 hours ending 09/20/19 1528    Physical Exam   Psychiatric:   EXAMINATION    CONSTITUTIONAL  General Appearance: age appropriate    MUSCULOSKELETAL  Muscle Strength and Tone: unable to assess  Abnormal Involuntary Movements: none noted  Gait and Station: not observed    PSYCHIATRIC MENTAL STATUS EXAM   Level of Consciousness: eyes closed, opens eyes at pleasure  Orientation: unable to assess  Grooming: wearing hospital attire  Psychomotor Behavior: no abnormalities noted  Speech: unable to assess  Language: unable to assess  Mood: unable to assess  Affect: sad  Thought Process: unable to assess  Associations: unable to assess  Thought Content: unable to assess  Memory: unable to assess  Attention: unable to assess  Fund of Knowledge: " unable to assess  Insight: unable to assess  Judgment: unable to assess          Significant Labs: All pertinent labs within the past 24 hours have been reviewed.    Significant Imaging: I have reviewed all pertinent imaging results/findings within the past 24 hours.

## 2019-09-20 NOTE — NURSING
Patient arrived to unit via stretcher. Patient smiles and moves extremities to repeated touch/painful stimuli.  He is nonverbal at this time.  He does not follow directions. Respirations even, unlabored. He is on 2 L oxygen per NC. His skin is cool and moist.  No distress noted. Generalized weakness noted.  3 assist to transfer to bed.  Cardiac monitoring continued. Seizure precautions maintained. Safety and fall precautions continued.

## 2019-09-20 NOTE — ED NOTES
Patients uncle at bedside. Patient alert and oriented with cellphone in hand. No request at this time. Patient encouraged to supply urine sample. Md requesting goncalves placement at this time.

## 2019-09-20 NOTE — PLAN OF CARE
09/20/19 1643   Discharge Assessment   Assessment Type Discharge Planning Assessment  (TN attempted to contact patient's mother. Unable to speak with patient's mother. Will attempt later. Bad reception during call. )

## 2019-09-20 NOTE — ED NOTES
Nurse called to ct; Nurse informed patient is screaming and kicking. Tech unable to complete scan due to patient being unable to keep still.

## 2019-09-20 NOTE — HPI
Teodora Sanchez is a 18 year old male who has a history of seizures since age 2. He was reportedly found by a neighbor's son with seizure activity and subsequently brought into [facility] ED.

## 2019-09-20 NOTE — NURSING
Patient passed nursing bedside swallow study.  AAO x 4.  Quiet, withdrawn, flat affect.  PEC sitter at bedside.  Safety and seizure precautions continued.

## 2019-09-21 VITALS
RESPIRATION RATE: 18 BRPM | BODY MASS INDEX: 29.34 KG/M2 | HEIGHT: 67 IN | OXYGEN SATURATION: 98 % | WEIGHT: 186.94 LBS | SYSTOLIC BLOOD PRESSURE: 111 MMHG | DIASTOLIC BLOOD PRESSURE: 65 MMHG | TEMPERATURE: 98 F | HEART RATE: 66 BPM

## 2019-09-21 LAB
ANION GAP SERPL CALC-SCNC: 11 MMOL/L (ref 8–16)
BUN SERPL-MCNC: 8 MG/DL (ref 6–20)
CALCIUM SERPL-MCNC: 9.2 MG/DL (ref 8.7–10.5)
CHLORIDE SERPL-SCNC: 109 MMOL/L (ref 95–110)
CO2 SERPL-SCNC: 20 MMOL/L (ref 23–29)
CREAT SERPL-MCNC: 0.8 MG/DL (ref 0.5–1.4)
EST. GFR  (AFRICAN AMERICAN): >60 ML/MIN/1.73 M^2
EST. GFR  (NON AFRICAN AMERICAN): >60 ML/MIN/1.73 M^2
GLUCOSE SERPL-MCNC: 78 MG/DL (ref 70–110)
POTASSIUM SERPL-SCNC: 4.3 MMOL/L (ref 3.5–5.1)
SODIUM SERPL-SCNC: 140 MMOL/L (ref 136–145)

## 2019-09-21 PROCEDURE — 96361 HYDRATE IV INFUSION ADD-ON: CPT

## 2019-09-21 PROCEDURE — 94761 N-INVAS EAR/PLS OXIMETRY MLT: CPT

## 2019-09-21 PROCEDURE — 63700000 PHARM REV CODE 250 ALT 637 W/O HCPCS: Performed by: EMERGENCY MEDICINE

## 2019-09-21 PROCEDURE — 80048 BASIC METABOLIC PNL TOTAL CA: CPT

## 2019-09-21 PROCEDURE — 36415 COLL VENOUS BLD VENIPUNCTURE: CPT

## 2019-09-21 PROCEDURE — G0378 HOSPITAL OBSERVATION PER HR: HCPCS

## 2019-09-21 PROCEDURE — 63600175 PHARM REV CODE 636 W HCPCS: Performed by: EMERGENCY MEDICINE

## 2019-09-21 PROCEDURE — 25000003 PHARM REV CODE 250: Performed by: EMERGENCY MEDICINE

## 2019-09-21 PROCEDURE — 25000003 PHARM REV CODE 250: Performed by: PSYCHIATRY & NEUROLOGY

## 2019-09-21 RX ORDER — DIVALPROEX SODIUM 500 MG/1
2000 TABLET, FILM COATED, EXTENDED RELEASE ORAL DAILY
Qty: 120 TABLET | Refills: 11 | Status: ON HOLD | OUTPATIENT
Start: 2019-09-22 | End: 2019-09-27 | Stop reason: HOSPADM

## 2019-09-21 RX ORDER — SODIUM CHLORIDE 9 MG/ML
INJECTION, SOLUTION INTRAVENOUS CONTINUOUS
Status: DISCONTINUED | OUTPATIENT
Start: 2019-09-21 | End: 2019-09-21

## 2019-09-21 RX ORDER — ZONISAMIDE 100 MG/1
100 CAPSULE ORAL DAILY
Qty: 30 CAPSULE | Refills: 11 | Status: SHIPPED | OUTPATIENT
Start: 2019-09-22 | End: 2021-02-09

## 2019-09-21 RX ADMIN — DIVALPROEX SODIUM 2000 MG: 500 TABLET, FILM COATED, EXTENDED RELEASE ORAL at 09:09

## 2019-09-21 RX ADMIN — ZONISAMIDE 100 MG: 100 CAPSULE ORAL at 09:09

## 2019-09-21 RX ADMIN — SODIUM CHLORIDE: 0.9 INJECTION, SOLUTION INTRAVENOUS at 02:09

## 2019-09-21 RX ADMIN — FAMOTIDINE 20 MG: 20 TABLET ORAL at 09:09

## 2019-09-21 RX ADMIN — FENOFIBRATE 160 MG: 160 TABLET ORAL at 09:09

## 2019-09-21 NOTE — ASSESSMENT & PLAN NOTE
"Mother concern patient behaviors are attempts to kill himself.   Patient seen by Psychiatry HIs overall symptom complex includes: sad affect, refusal to engage, shrugs shoulders when asked if he is trying to kill himself, inability to contract for safety, text message to mom stating displeasure with life, verbalization to mother that he does not take anti-seizure medication so that he can have a seizure and die, positive THCA. Teodora Sanchez is suicidal  On 9/21/19 When asked about trying to hurt himself, he stated " I dont know." When question repeated, patient stated now.   Patient medically clear to transfer to inpatient Psychiatric care.    "

## 2019-09-21 NOTE — PROGRESS NOTES
TN faxed patient's clinicals (facesheet, HP, MD notes, PEC, MAR, and MD notes) to [facility] at [phone]. Awaiting feedback.     Rose at [facility] informed.     1508- Rose stated she is reviewing patient. Awaiting call back.     1522- TN faxed patient's clinicals to [facility] at [phone]. Awaiting feedback.    TN faxed patient's clinicals to [facility], and [facility]. Awaiting feedback.

## 2019-09-21 NOTE — NURSING
Lab called re: another CPK needed at this time per order? Per EDGARDO Maxwell patient doesn't need at this time. Already drawn at 1836.

## 2019-09-21 NOTE — PROGRESS NOTES
1621- Call received from [facility]. Patient accepted. Accepting physician is Murray Joel MD. Nurse can call report to [phone].     Call received from Sadie at [facility].  Sadie stated they can accept patient.       2476- LINDA Isaac informed that patient has been accepted by [facility].

## 2019-09-21 NOTE — NURSING
Discharge order placed for transfer to [facility].  IV discontinued without complaint, catheter intact..  Telemetry monitoring discontinued    Report called to [place].  Transport scheduled for 1800.

## 2019-09-21 NOTE — ASSESSMENT & PLAN NOTE
See HP above. Had one seizure PTA and another episode in ED. Keppra 2 g IV and ativan 4 mg in ED.   Followed by Neurology Dr. 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  CT head no acute intracranial abnormality  Today 9/21, patient is AAO x 4. No seizure activity over night.   Appreciate Neurology consult-continue APA 2000 mg daily and zonisamide 100 mg daily  Seizure/Aspiration precaution, Tele

## 2019-09-21 NOTE — PROGRESS NOTES
"[facility] Hospital Medicine  Progress Note    Patient Name: Teodora Sanchez  MRN: [id number]  Patient Class: OP- Observation   Admission Date: 9/19/2019  Length of Stay: 0 days  Attending Physician: Manolo Barton MD  Primary Care Provider: Shayna Dubose MD        Subjective:     Principal Problem:Recurrent seizures        HPI:  Mr. Sanchez is a 17 yo male with history seizure since age 2 and chronic non compliance with seizure medication (depakote and zonisamide) who found by Neighbor's Son with seizure activity. Patient unable to provide history at this time.  Spoke with Mom via phone who reports patient is followed by [facility] Dr. Charles Woodruff (last seen 9/5).  Mother reports picked patient up from working 12 hr night shift at 630 am (Works as  at [employer]). At 5 pm mom said patient did not  phone which prompt Aunt Tootie to go check up on patient at which point patient reported not feeling well so did not want to go to work. Mom reports initial seizure presentation were stares but in high school presentation worsen bith body jerks. Mom reports seizure very frequent with most recent 9/5 and 9/7 "comes out of it" post ictal (disoriented) about 30 minutes.  Smokes marijuana. Mom not aware of alcohol or use of illicit drug use.     X ray shoulder no acute fracture. CT cervical no displaced fracture. Drug screen positive for THC. Alcohol level normal. Valporic and Zonisamide level pending.   In ED, patient had witnessed seizure at 4am? And received total 4 mg lorazepam. IV keppra 2000 g given in ED.       Primary Neurologist checked level Valporic acid and Zonisamide on9/5  which both were non therapeutic range.     Overview/Hospital Course:  Mr. Sanchez is a 18 male who was admitted to observation for seizure due to non compliance with seizure medications. Patient was in post ictal state on arrival to unit but slowly became more awake though out day. " Neurology started VPA ER 2000 mg daily  and zoniasmide 100 mg daily. VPA level sub theurapeutic range and zoniasmide level pending. Psychiatry consult for possible depression and mother reporting patient is trying to kill himself by not taking AED. Psychiatry felt patient with severe major depression. Patient PEC.  On 9/21/19, patient is awake and oriented x 4.     Interval History: Feels fine. Smiling.     Review of Systems   Constitutional: Negative for appetite change, fatigue and fever.   Respiratory: Negative.    Cardiovascular: Negative.    Gastrointestinal: Negative.    Genitourinary: Negative.    Musculoskeletal: Negative.    Neurological: Positive for seizures.     Objective:     Vital Signs (Most Recent):  Temp: 98.5 °F (36.9 °C) (09/21/19 0725)  Pulse: (!) 56 (09/21/19 0725)  Resp: 18 (09/21/19 0725)  BP: 119/72 (09/21/19 0725)  SpO2: 96 % (09/21/19 0819) Vital Signs (24h Range):  Temp:  [97.7 °F (36.5 °C)-98.6 °F (37 °C)] 98.5 °F (36.9 °C)  Pulse:  [45-69] 56  Resp:  [17-18] 18  SpO2:  [96 %-100 %] 96 %  BP: (107-127)/(57-88) 119/72     Weight: 84.8 kg (186 lb 15.2 oz)  Body mass index is 29.28 kg/m².    Intake/Output Summary (Last 24 hours) at 9/21/2019 1137  Last data filed at 9/21/2019 0600  Gross per 24 hour   Intake 1727.08 ml   Output 475 ml   Net 1252.08 ml      Physical Exam   Constitutional: He is oriented to person, place, and time. He appears well-developed and well-nourished. No distress.   Smiling through out   HENT:   Head: Atraumatic.   Eyes: EOM are normal.   Neck: Normal range of motion. Neck supple.   Cardiovascular: Normal rate.   Pulmonary/Chest: Effort normal and breath sounds normal.   Abdominal: Soft. Bowel sounds are normal.   Musculoskeletal: Normal range of motion.   Neurological: He is alert and oriented to person, place, and time.       Significant Labs: All pertinent labs within the past 24 hours have been reviewed.    Significant Imaging: I have reviewed and interpreted all  "pertinent imaging results/findings within the past 24 hours.      Assessment/Plan:      * Recurrent seizures  See HP above. Had one seizure PTA and another episode in ED. Keppra 2 g IV and ativan 4 mg in ED.   Followed by Neurology Dr. 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  CT head no acute intracranial abnormality  Today 9/21, patient is AAO x 4. No seizure activity over night.   Appreciate Neurology consult-continue APA 2000 mg daily and zonisamide 100 mg daily  Seizure/Aspiration precaution, Tele      Major depressive disorder, severe  Mother concern patient behaviors are attempts to kill himself.   Patient seen by Psychiatry HIs overall symptom complex includes: sad affect, refusal to engage, shrugs shoulders when asked if he is trying to kill himself, inability to contract for safety, text message to mom stating displeasure with life, verbalization to mother that he does not take anti-seizure medication so that he can have a seizure and die, positive THCA. Teodora Sanchez is suicidal  On 9/21/19 When asked about trying to hurt himself, he stated " I dont know." When question repeated, patient stated now.   Patient medically clear to transfer to inpatient Psychiatric care.      Marijuana abuse  Reports does not smoke every day. Encourage stop smoking marijuana.       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

## 2019-09-21 NOTE — SUBJECTIVE & OBJECTIVE
Interval History: Feels fine. Smiling.     Review of Systems   Constitutional: Negative for appetite change, fatigue and fever.   Respiratory: Negative.    Cardiovascular: Negative.    Gastrointestinal: Negative.    Genitourinary: Negative.    Musculoskeletal: Negative.    Neurological: Positive for seizures.     Objective:     Vital Signs (Most Recent):  Temp: 98.5 °F (36.9 °C) (09/21/19 0725)  Pulse: (!) 56 (09/21/19 0725)  Resp: 18 (09/21/19 0725)  BP: 119/72 (09/21/19 0725)  SpO2: 96 % (09/21/19 0819) Vital Signs (24h Range):  Temp:  [97.7 °F (36.5 °C)-98.6 °F (37 °C)] 98.5 °F (36.9 °C)  Pulse:  [45-69] 56  Resp:  [17-18] 18  SpO2:  [96 %-100 %] 96 %  BP: (107-127)/(57-88) 119/72     Weight: 84.8 kg (186 lb 15.2 oz)  Body mass index is 29.28 kg/m².    Intake/Output Summary (Last 24 hours) at 9/21/2019 1137  Last data filed at 9/21/2019 0600  Gross per 24 hour   Intake 1727.08 ml   Output 475 ml   Net 1252.08 ml      Physical Exam   Constitutional: He is oriented to person, place, and time. He appears well-developed and well-nourished. No distress.   Smiling through out   HENT:   Head: Atraumatic.   Eyes: EOM are normal.   Neck: Normal range of motion. Neck supple.   Cardiovascular: Normal rate.   Pulmonary/Chest: Effort normal and breath sounds normal.   Abdominal: Soft. Bowel sounds are normal.   Musculoskeletal: Normal range of motion.   Neurological: He is alert and oriented to person, place, and time.       Significant Labs: All pertinent labs within the past 24 hours have been reviewed.    Significant Imaging: I have reviewed and interpreted all pertinent imaging results/findings within the past 24 hours.

## 2019-09-21 NOTE — PROCEDURES - EMERGENCY DEPT.
ADT 30 order placed for Transportation.      ETA: 6:00pm,. Patient PEC'd  If transportation does not arrive at ETA time nurse will be instructed to follow protocol for transportation below:  How can I get in touch directly with dispatch, if needed?                 Non-emergent dispatch: [phone]                                    Emergent dispatch: [phone]  Non-emergent (stretcher): [phone]  Escalation Needs (PFC Lead): [phone]

## 2019-09-21 NOTE — NURSING
Patient has active PEC in place. No c/o suicidal thoughts at this time. Sitter at bedside, seizure precaution in place. Slept thru most of the shift. Awake and wanting to go home today, states has to play in a baseball game. AAOx4. No c/o pain. Thirsty and hungry.

## 2019-09-21 NOTE — NURSING
"Handoff shift report from Mary DUBOSE RN.  Patient's mood is abnormally elevated, continuously smiling.  He states, "I have got to get out of here today, I have a baseball game."  Then he states, " I have got to get out of here, I have to go to work."  Nurse explained PEC hold and patient exclaimed, "Aw man!" while smiling.    Strict bedrest enforced for seizure precautions.   Safety sitter at bedside.  Awaiting  to assess patient.  Psych + Neuro following.    "

## 2019-09-21 NOTE — PLAN OF CARE
Problem: Adult Inpatient Plan of Care  Goal: Plan of Care Review  Outcome: Ongoing (interventions implemented as appropriate)     09/21/19 0619   Plan of Care Review   Plan of Care Reviewed With patient   Progress improving       Problem: Seizure, Active Management  Goal: Absence of Seizure/Seizure-Related Injury  Outcome: Ongoing (interventions implemented as appropriate)  Intervention: Prevent Seizure-Related Injury     09/21/19 0619   Prevent or Manage Neurotoxicity Effects   Seizure Precautions activity supervised;clutter-free environment maintained;emergency equipment at bedside         Problem: Suicide Risk  Goal: Absence of Self-Harm  Outcome: Ongoing (interventions implemented as appropriate)  Intervention: Assess Risk to Self and Maintain Safety     09/21/19 0619   Support Psychosocial Response to Acute Illness   Safety/Security Measures bed alarm set; at bedside   Provide Emotional and Physical Safety   Behavior Management behavioral plan reviewed

## 2019-09-21 NOTE — PLAN OF CARE
Discharge packet and call report information provided to Nurse Whipple.      Patient can discharge from  standpoint.       09/21/19 1635   Final Note   Assessment Type Final Discharge Note   Anticipated Discharge Disposition Psych   What phone number can be called within the next 1-3 days to see how you are doing after discharge?   ([phone])   Right Care Referral Info   Post Acute Recommendation Other   Facility Name   ([facility] )

## 2019-09-21 NOTE — PLAN OF CARE
Patient stated he resides with his aunt. Patient will discharge to an [facility] hospital .       09/21/19 2396   Discharge Assessment   Assessment Type Discharge Planning Assessment   Assessment information obtained from? Patient   Prior to hospitilization cognitive status: Alert/Oriented   Prior to hospitalization functional status: Independent   Current Functional Status: Independent   Lives With other (see comments)  (AUNT )   Able to Return to Prior Arrangements no   Is patient able to care for self after discharge? Unable to determine at this time (comments)   Who are your caregiver(s) and their phone number(s)? Dakota Mckeonmother @ [phone]   Patient's perception of discharge disposition psychiatric hospital   Readmission Within the Last 30 Days no previous admission in last 30 days   Patient currently being followed by outpatient case management? No   Patient currently receives any other outside agency services? No   Equipment Currently Used at Home none   Do you have any problems affording any of your prescribed medications? No   Is the patient taking medications as prescribed?   (patient stated sometimes)   Does the patient receive services at the [facility]? No   Discharge Plan A Psychiatric hospital   Discharge Plan B Psychiatric hospital

## 2019-09-21 NOTE — HOSPITAL COURSE
Mr. Sanchez is a 18 male who was admitted to observation for seizure due to non compliance with seizure medications. Patient was in post ictal state on arrival to unit but slowly became more awake though out day. Neurology started VPA ER 2000 mg daily  and zoniasmide 100 mg daily. VPA level sub theurapeutic range and zoniasmide level pending. Psychiatry consult for possible depression and mother reporting patient is trying to kill himself by not taking AED. Psychiatry felt patient with severe major depression. Patient PEC.  On 9/21/19, patient is awake and oriented x 4. Patient stable for discharge to [facility].

## 2019-09-22 PROBLEM — J45.20 MILD INTERMITTENT ASTHMA WITHOUT COMPLICATION: Status: ACTIVE | Noted: 2019-09-22

## 2019-09-22 PROBLEM — Z13.9 ENCOUNTER FOR MEDICAL SCREENING EXAMINATION: Status: ACTIVE | Noted: 2019-09-22

## 2019-09-22 PROBLEM — F32.9 MAJOR DEPRESSION: Status: ACTIVE | Noted: 2019-09-22

## 2019-09-22 NOTE — NURSING
"Received return call from [facility] staff member Julio stating, "Transport said due to high call volume give them another hour." Therefore, pt's ETA from [facility] to [facility] in now 9pm.   "

## 2019-09-22 NOTE — NURSING
Pt discharged. PIV and tele monitor discontinued during previous shift. Also, report called to [facility] by previous shift nurse, Sole. Ambulated off unit with a steady gait escorted by hospital security and SDP transporter. Pt's mother Dakota Mims called at [phone] and informed pt has just left the unit. Pt denies any pain and appeared to be in no distress while leaving unit.

## 2019-09-22 NOTE — DISCHARGE SUMMARY
"[facility] Hospital Medicine  Discharge Summary      Patient Name: Teodora Sanchez  MRN: [id number]  Admission Date: 9/19/2019  Hospital Length of Stay: 0 days  Discharge Date and Time: 9/21/19  Attending Physician: No att. providers found   Discharging Provider: Kizzy Velazquez NP  Primary Care Provider: Shayna Dubose MD      HPI:   Mr. Sanchez is a 19 yo male with history seizure since age 2 and chronic non compliance with seizure medication (depakote and zonisamide) who found by Neighbor's Son with seizure activity. Patient unable to provide history at this time.  Spoke with Mom via phone who reports patient is followed by [facility] Dr. Charles Woodruff (last seen 9/5).  Mother reports picked patient up from working 12 hr night shift at 630 am (Works as  at [employer]). At 5 pm mom said patient did not  phone which prompt Aunt Tootie to go check up on patient at which point patient reported not feeling well so did not want to go to work. Mom reports initial seizure presentation were stares but in high school presentation worsen bith body jerks. Mom reports seizure very frequent with most recent 9/5 and 9/7 "comes out of it" post ictal (disoriented) about 30 minutes.  Smokes marijuana. Mom not aware of alcohol or use of illicit drug use.     X ray shoulder no acute fracture. CT cervical no displaced fracture. Drug screen positive for THC. Alcohol level normal. Valporic and Zonisamide level pending.   In ED, patient had witnessed seizure at 4am? And received total 4 mg lorazepam. IV keppra 2000 g given in ED.       Primary Neurologist checked level Valporic acid and Zonisamide on9/5  which both were non therapeutic range.     * No surgery found *      Hospital Course:   Mr. Sanchez is a 18 male who was admitted to observation for seizure due to non compliance with seizure medications. Patient was in post ictal state on arrival to unit but slowly became more awake " though out day. Neurology started VPA ER 2000 mg daily  and zoniasmide 100 mg daily. VPA level sub theurapeutic range and zoniasmide level pending. Psychiatry consult for possible depression and mother reporting patient is trying to kill himself by not taking AED. Psychiatry felt patient with severe major depression. Patient PEC.  On 9/21/19, patient is awake and oriented x 4. Patient stable for discharge to [facility].     Consults:   Consults (From admission, onward)        Status Ordering Provider     Inpatient consult to Neurology  Once     Provider:  Romero Rubio MD    Completed MARIA EUGENIA MAE     Inpatient consult to Psychiatry  Once     Provider:  Ruth Ricks NP    Completed SUREKHA KAYE          No new Assessment & Plan notes have been filed under this hospital service since the last note was generated.  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    Specialties:  Internal Medicine, Wound Care  Contact information:  [address] [address]  [address]  [place] [id number]  [phone]                 Patient Instructions:      Diet Adult Regular     Notify your health care provider if you experience any of the following:  temperature >100.4     Notify your health care provider if you experience any of the following:  persistent nausea and vomiting or diarrhea     Notify your health care provider if you experience any of the following:  increased confusion or weakness     Notify your health care provider  if you experience any of the following:  persistent dizziness, light-headedness, or visual disturbances     Activity as tolerated       Significant Diagnostic Studies: Labs:   BMP:   Recent Labs   Lab 09/21/19  0425   GLU 78      K 4.3      CO2 20*   BUN 8   CREATININE 0.8   CALCIUM 9.2   , CMP   Recent Labs   Lab 09/21/19  0425      K 4.3      CO2 20*   GLU 78   BUN 8   CREATININE 0.8   CALCIUM 9.2   ANIONGAP 11   ESTGFRAFRICA >60   EGFRNONAA >60   , CBC No results for input(s): WBC, HGB, HCT, PLT in the last 48 hours., INR No results found for: INR, PROTIME, Lipid Panel   Lab Results   Component Value Date    CHOL 174 04/29/2019    HDL 32 (L) 04/29/2019    LDLCALC 97.6 04/29/2019    TRIG 222 (H) 04/29/2019    CHOLHDL 18.4 (L) 04/29/2019   , Troponin   Recent Labs   Lab 09/20/19  0036   TROPONINI <0.006   , A1C:   Recent Labs   Lab 04/29/19  0914   HGBA1C 5.3    and All labs within the past 24 hours have been reviewed    Pending Diagnostic Studies:     Procedure Component Value Units Date/Time    Zonisamide level [[id number]] Collected:  09/20/19 1442    Order Status:  Sent Lab Status:  In process Updated:  09/21/19 0914    Specimen:  Blood     Narrative:       Recoll.  by [facility] at 09/20/2019 14:42, reason: Lab accident; Tube has   been refrigerated; called to [facility]    09/20/2019  14:42         Medications:  Reconciled Home Medications:      Medication List      CHANGE how you take these medications    divalproex  MG Tb24  Commonly known as:  DEPAKOTE  Take 4 tablets (2,000 mg total) by mouth once daily.  What changed:  how much to take     zonisamide 100 MG Cap  Commonly known as:  ZONEGRAN  Take 1 capsule (100 mg total) by mouth once daily.  What changed:  See the new instructions.        CONTINUE taking these medications    albuterol 1.25 mg/3 mL Nebu  Commonly known as:  ACCUNEB  Take 1.25 mg by nebulization every 6 (six) hours as needed.     fenofibrate 160 MG Tab  Take 1  tablet (160 mg total) by mouth once daily.     ketoconazole 2 % cream  Commonly known as:  NIZORAL  Apply topically once daily.        STOP taking these medications    ibuprofen 400 MG tablet  Commonly known as:  ADVIL,MOTRIN     ibuprofen 600 MG tablet  Commonly known as:  ADVIL,MOTRIN            Indwelling Lines/Drains at time of discharge:   Lines/Drains/Airways     None                 Time spent on the discharge of patient: 30 minutes  Patient was seen and examined on the date of discharge and determined to be suitable for discharge.         Kizzy Velazquez NP  Department of Hospital Medicine  [facility]

## 2019-09-24 LAB — ZONISAMIDE SERPL-MCNC: 1.5 MCG/ML (ref 10–40)

## 2019-11-05 ENCOUNTER — PATIENT MESSAGE (OUTPATIENT)
Dept: NEUROLOGY | Facility: CLINIC | Age: 19
End: 2019-11-05

## 2019-12-23 PROBLEM — Z13.9 ENCOUNTER FOR MEDICAL SCREENING EXAMINATION: Status: RESOLVED | Noted: 2019-09-22 | Resolved: 2019-12-23

## 2020-01-06 ENCOUNTER — TELEPHONE (OUTPATIENT)
Dept: NEUROLOGY | Facility: CLINIC | Age: 20
End: 2020-01-06

## 2020-01-06 NOTE — TELEPHONE ENCOUNTER
----- Message from Boris North sent at 1/6/2020 10:58 AM CST -----  Contact: [phone] or [phone] / self   Patient is requesting your office to fax medical records to his new doctor. Fax is [phone]

## 2020-01-06 NOTE — TELEPHONE ENCOUNTER
I called the patient and talked with the mother the medical release of information. She understood.

## 2020-04-08 ENCOUNTER — PATIENT OUTREACH (OUTPATIENT)
Dept: ADMINISTRATIVE | Facility: OTHER | Age: 20
End: 2020-04-08

## 2020-07-01 ENCOUNTER — PATIENT OUTREACH (OUTPATIENT)
Dept: ADMINISTRATIVE | Facility: OTHER | Age: 20
End: 2020-07-01

## 2020-07-01 NOTE — PROGRESS NOTES
Requested updates within Care Everywhere.  Patient's chart was reviewed for overdue ALHAJI topics.  Immunizations reconciled.

## 2020-08-14 DIAGNOSIS — Z11.59 NEED FOR HEPATITIS C SCREENING TEST: ICD-10-CM

## 2020-09-14 ENCOUNTER — TELEPHONE (OUTPATIENT)
Dept: ORTHOPEDICS | Facility: CLINIC | Age: 20
End: 2020-09-14

## 2020-09-14 NOTE — TELEPHONE ENCOUNTER
----- Message from Bridget Ladd sent at 9/14/2020  4:33 PM CDT -----  Contact: patient// [phone]  Refill request for divalproex ER (DEPAKOTE) 500 MG Tb24  PHARMACY: [facility] [address] [address]

## 2020-09-15 RX ORDER — DIVALPROEX SODIUM 500 MG/1
2000 TABLET, FILM COATED, EXTENDED RELEASE ORAL DAILY
Qty: 360 TABLET | Refills: 0 | Status: SHIPPED | OUTPATIENT
Start: 2020-09-15 | End: 2021-01-01 | Stop reason: SDUPTHER

## 2020-09-17 ENCOUNTER — TELEPHONE (OUTPATIENT)
Dept: NEUROLOGY | Facility: CLINIC | Age: 20
End: 2020-09-17

## 2020-09-17 NOTE — TELEPHONE ENCOUNTER
----- Message from Rocío Andrews sent at 9/17/2020 10:05 AM CDT -----  Contact: [phone]  Pt is requesting a callback.    Please call

## 2020-10-05 ENCOUNTER — PATIENT OUTREACH (OUTPATIENT)
Dept: ADMINISTRATIVE | Facility: OTHER | Age: 20
End: 2020-10-05

## 2020-10-05 ENCOUNTER — PATIENT MESSAGE (OUTPATIENT)
Dept: ADMINISTRATIVE | Facility: HOSPITAL | Age: 20
End: 2020-10-05

## 2020-10-05 NOTE — PROGRESS NOTES
Health Maintenance Due   Topic Date Due    Hepatitis C Screening  1998    HPV Vaccines (1 - Male 2-dose series) 10/01/2009    HIV Screening  10/01/2013    TETANUS VACCINE  10/01/2016    Influenza Vaccine (1) 08/01/2020     Updates were requested from care everywhere.  Chart was reviewed for overdue Proactive [facility] Encounters (AL[facility]JI) topics (CRS, Breast Cancer Screening, Eye exam)  Health Maintenance has been updated.  LINKS immunization registry triggered.  Immunizations were reconciled.

## 2021-01-04 RX ORDER — DIVALPROEX SODIUM 500 MG/1
2000 TABLET, FILM COATED, EXTENDED RELEASE ORAL DAILY
Qty: 360 TABLET | Refills: 0 | Status: SHIPPED | OUTPATIENT
Start: 2021-01-04 | End: 2021-02-09 | Stop reason: SDUPTHER

## 2021-01-05 ENCOUNTER — PATIENT MESSAGE (OUTPATIENT)
Dept: ADMINISTRATIVE | Facility: HOSPITAL | Age: 21
End: 2021-01-05

## 2021-02-09 ENCOUNTER — LAB VISIT (OUTPATIENT)
Dept: LAB | Facility: HOSPITAL | Age: 21
End: 2021-02-09
Attending: INTERNAL MEDICINE
Payer: COMMERCIAL

## 2021-02-09 ENCOUNTER — OFFICE VISIT (OUTPATIENT)
Dept: FAMILY MEDICINE | Facility: CLINIC | Age: 21
End: 2021-02-09
Payer: COMMERCIAL

## 2021-02-09 VITALS
WEIGHT: 201.25 LBS | HEART RATE: 84 BPM | TEMPERATURE: 98 F | OXYGEN SATURATION: 97 % | HEIGHT: 69 IN | BODY MASS INDEX: 29.81 KG/M2

## 2021-02-09 DIAGNOSIS — Z23 NEEDS FLU SHOT: ICD-10-CM

## 2021-02-09 DIAGNOSIS — Z00.00 HEALTH CARE MAINTENANCE: ICD-10-CM

## 2021-02-09 DIAGNOSIS — Z13.220 LIPID SCREENING: ICD-10-CM

## 2021-02-09 DIAGNOSIS — Z11.59 NEED FOR HEPATITIS C SCREENING TEST: ICD-10-CM

## 2021-02-09 DIAGNOSIS — G40.319 INTRACTABLE GENERALIZED IDIOPATHIC EPILEPSY WITHOUT STATUS EPILEPTICUS: Primary | ICD-10-CM

## 2021-02-09 DIAGNOSIS — G40.319 INTRACTABLE GENERALIZED IDIOPATHIC EPILEPSY WITHOUT STATUS EPILEPTICUS: ICD-10-CM

## 2021-02-09 LAB
ALBUMIN SERPL BCP-MCNC: 4.1 G/DL (ref 3.5–5.2)
ALP SERPL-CCNC: 62 U/L (ref 55–135)
ALT SERPL W/O P-5'-P-CCNC: 34 U/L (ref 10–44)
ANION GAP SERPL CALC-SCNC: 10 MMOL/L (ref 8–16)
AST SERPL-CCNC: 19 U/L (ref 10–40)
BASOPHILS # BLD AUTO: 0.03 K/UL (ref 0–0.2)
BASOPHILS NFR BLD: 0.4 % (ref 0–1.9)
BILIRUB SERPL-MCNC: 0.5 MG/DL (ref 0.1–1)
BUN SERPL-MCNC: 8 MG/DL (ref 6–20)
CALCIUM SERPL-MCNC: 9.2 MG/DL (ref 8.7–10.5)
CHLORIDE SERPL-SCNC: 107 MMOL/L (ref 95–110)
CHOLEST SERPL-MCNC: 152 MG/DL (ref 120–199)
CHOLEST/HDLC SERPL: 4.6 {RATIO} (ref 2–5)
CO2 SERPL-SCNC: 26 MMOL/L (ref 23–29)
CREAT SERPL-MCNC: 0.8 MG/DL (ref 0.5–1.4)
DIFFERENTIAL METHOD: ABNORMAL
EOSINOPHIL # BLD AUTO: 0.1 K/UL (ref 0–0.5)
EOSINOPHIL NFR BLD: 1.3 % (ref 0–8)
ERYTHROCYTE [DISTWIDTH] IN BLOOD BY AUTOMATED COUNT: 14.5 % (ref 11.5–14.5)
EST. GFR  (AFRICAN AMERICAN): >60 ML/MIN/1.73 M^2
EST. GFR  (NON AFRICAN AMERICAN): >60 ML/MIN/1.73 M^2
GLUCOSE SERPL-MCNC: 83 MG/DL (ref 70–110)
HCT VFR BLD AUTO: 38.8 % (ref 40–54)
HDLC SERPL-MCNC: 33 MG/DL (ref 40–75)
HDLC SERPL: 21.7 % (ref 20–50)
HGB BLD-MCNC: 12.7 G/DL (ref 14–18)
IMM GRANULOCYTES # BLD AUTO: 0.03 K/UL (ref 0–0.04)
IMM GRANULOCYTES NFR BLD AUTO: 0.4 % (ref 0–0.5)
LDLC SERPL CALC-MCNC: 98.4 MG/DL (ref 63–159)
LYMPHOCYTES # BLD AUTO: 1.7 K/UL (ref 1–4.8)
LYMPHOCYTES NFR BLD: 24.9 % (ref 18–48)
MCH RBC QN AUTO: 29.4 PG (ref 27–31)
MCHC RBC AUTO-ENTMCNC: 32.7 G/DL (ref 32–36)
MCV RBC AUTO: 90 FL (ref 82–98)
MONOCYTES # BLD AUTO: 0.5 K/UL (ref 0.3–1)
MONOCYTES NFR BLD: 7.9 % (ref 4–15)
NEUTROPHILS # BLD AUTO: 4.4 K/UL (ref 1.8–7.7)
NEUTROPHILS NFR BLD: 65.1 % (ref 38–73)
NONHDLC SERPL-MCNC: 119 MG/DL
NRBC BLD-RTO: 0 /100 WBC
PLATELET # BLD AUTO: 211 K/UL (ref 150–350)
PMV BLD AUTO: 11.5 FL (ref 9.2–12.9)
POTASSIUM SERPL-SCNC: 4.1 MMOL/L (ref 3.5–5.1)
PROT SERPL-MCNC: 7.4 G/DL (ref 6–8.4)
RBC # BLD AUTO: 4.32 M/UL (ref 4.6–6.2)
SODIUM SERPL-SCNC: 143 MMOL/L (ref 136–145)
TRIGL SERPL-MCNC: 103 MG/DL (ref 30–150)
TSH SERPL DL<=0.005 MIU/L-ACNC: 3.01 UIU/ML (ref 0.4–4)
VALPROATE SERPL-MCNC: 26.7 UG/ML (ref 50–100)
WBC # BLD AUTO: 6.83 K/UL (ref 3.9–12.7)

## 2021-02-09 PROCEDURE — 99213 OFFICE O/P EST LOW 20 MIN: CPT | Mod: PBBFAC,PO,25 | Performed by: INTERNAL MEDICINE

## 2021-02-09 PROCEDURE — 83036 HEMOGLOBIN GLYCOSYLATED A1C: CPT

## 2021-02-09 PROCEDURE — 99214 PR OFFICE/OUTPT VISIT, EST, LEVL IV, 30-39 MIN: ICD-10-PCS | Mod: S$GLB,,, | Performed by: INTERNAL MEDICINE

## 2021-02-09 PROCEDURE — 80053 COMPREHEN METABOLIC PANEL: CPT

## 2021-02-09 PROCEDURE — 86803 HEPATITIS C AB TEST: CPT

## 2021-02-09 PROCEDURE — 90686 IIV4 VACC NO PRSV 0.5 ML IM: CPT | Mod: PBBFAC,PO

## 2021-02-09 PROCEDURE — 80061 LIPID PANEL: CPT

## 2021-02-09 PROCEDURE — 80164 ASSAY DIPROPYLACETIC ACD TOT: CPT

## 2021-02-09 PROCEDURE — 99999 PR PBB SHADOW E&M-EST. PATIENT-LVL III: ICD-10-PCS | Mod: PBBFAC,,, | Performed by: INTERNAL MEDICINE

## 2021-02-09 PROCEDURE — 84443 ASSAY THYROID STIM HORMONE: CPT

## 2021-02-09 PROCEDURE — 85025 COMPLETE CBC W/AUTO DIFF WBC: CPT

## 2021-02-09 PROCEDURE — 99999 PR PBB SHADOW E&M-EST. PATIENT-LVL III: CPT | Mod: PBBFAC,,, | Performed by: INTERNAL MEDICINE

## 2021-02-09 PROCEDURE — 99214 OFFICE O/P EST MOD 30 MIN: CPT | Mod: S$GLB,,, | Performed by: INTERNAL MEDICINE

## 2021-02-09 RX ORDER — DIVALPROEX SODIUM 500 MG/1
2000 TABLET, FILM COATED, EXTENDED RELEASE ORAL DAILY
Qty: 360 TABLET | Refills: 0 | Status: SHIPPED | OUTPATIENT
Start: 2021-02-09 | End: 2021-04-05 | Stop reason: SDUPTHER

## 2021-02-10 DIAGNOSIS — D64.9 NORMOCYTIC ANEMIA: Primary | ICD-10-CM

## 2021-02-10 LAB
ESTIMATED AVG GLUCOSE: 100 MG/DL (ref 68–131)
HBA1C MFR BLD: 5.1 % (ref 4–5.6)
HCV AB SERPL QL IA: NEGATIVE

## 2021-02-27 ENCOUNTER — HOSPITAL ENCOUNTER (EMERGENCY)
Facility: HOSPITAL | Age: 21
Discharge: HOME | End: 2021-02-27
Attending: EMERGENCY MEDICINE
Payer: COMMERCIAL

## 2021-02-27 VITALS
SYSTOLIC BLOOD PRESSURE: 99 MMHG | HEIGHT: 70 IN | HEART RATE: 75 BPM | OXYGEN SATURATION: 99 % | DIASTOLIC BLOOD PRESSURE: 55 MMHG | TEMPERATURE: 99 F | WEIGHT: 198 LBS | RESPIRATION RATE: 17 BRPM | BODY MASS INDEX: 28.35 KG/M2

## 2021-02-27 DIAGNOSIS — R56.9 SEIZURE: Primary | ICD-10-CM

## 2021-02-27 DIAGNOSIS — R59.0 CERVICAL LYMPHADENOPATHY: ICD-10-CM

## 2021-02-27 LAB
ALBUMIN SERPL BCP-MCNC: 4 G/DL (ref 3.5–5.2)
ALP SERPL-CCNC: 77 U/L (ref 55–135)
ALT SERPL W/O P-5'-P-CCNC: 18 U/L (ref 10–44)
ANION GAP SERPL CALC-SCNC: 9 MMOL/L (ref 8–16)
AST SERPL-CCNC: 22 U/L (ref 10–40)
BASOPHILS # BLD AUTO: 0.03 K/UL (ref 0–0.2)
BASOPHILS NFR BLD: 0.4 % (ref 0–1.9)
BILIRUB SERPL-MCNC: 0.5 MG/DL (ref 0.1–1)
BUN SERPL-MCNC: 10 MG/DL (ref 6–20)
CALCIUM SERPL-MCNC: 8.7 MG/DL (ref 8.7–10.5)
CHLORIDE SERPL-SCNC: 104 MMOL/L (ref 95–110)
CO2 SERPL-SCNC: 26 MMOL/L (ref 23–29)
CREAT SERPL-MCNC: 1 MG/DL (ref 0.5–1.4)
DIFFERENTIAL METHOD: ABNORMAL
EOSINOPHIL # BLD AUTO: 0.1 K/UL (ref 0–0.5)
EOSINOPHIL NFR BLD: 1 % (ref 0–8)
ERYTHROCYTE [DISTWIDTH] IN BLOOD BY AUTOMATED COUNT: 13 % (ref 11.5–14.5)
EST. GFR  (AFRICAN AMERICAN): >60 ML/MIN/1.73 M^2
EST. GFR  (NON AFRICAN AMERICAN): >60 ML/MIN/1.73 M^2
GLUCOSE SERPL-MCNC: 89 MG/DL (ref 70–110)
HCT VFR BLD AUTO: 37.3 % (ref 40–54)
HGB BLD-MCNC: 12.7 G/DL (ref 14–18)
IMM GRANULOCYTES # BLD AUTO: 0.03 K/UL (ref 0–0.04)
IMM GRANULOCYTES NFR BLD AUTO: 0.4 % (ref 0–0.5)
LYMPHOCYTES # BLD AUTO: 2.1 K/UL (ref 1–4.8)
LYMPHOCYTES NFR BLD: 30 % (ref 18–48)
MCH RBC QN AUTO: 30.1 PG (ref 27–31)
MCHC RBC AUTO-ENTMCNC: 34 G/DL (ref 32–36)
MCV RBC AUTO: 88 FL (ref 82–98)
MONOCYTES # BLD AUTO: 0.7 K/UL (ref 0.3–1)
MONOCYTES NFR BLD: 9.5 % (ref 4–15)
NEUTROPHILS # BLD AUTO: 4.1 K/UL (ref 1.8–7.7)
NEUTROPHILS NFR BLD: 58.7 % (ref 38–73)
NRBC BLD-RTO: 0 /100 WBC
PLATELET # BLD AUTO: 260 K/UL (ref 150–350)
PMV BLD AUTO: 11.3 FL (ref 9.2–12.9)
POTASSIUM SERPL-SCNC: 4 MMOL/L (ref 3.5–5.1)
PROT SERPL-MCNC: 7.6 G/DL (ref 6–8.4)
RBC # BLD AUTO: 4.22 M/UL (ref 4.6–6.2)
SODIUM SERPL-SCNC: 139 MMOL/L (ref 136–145)
VALPROATE SERPL-MCNC: <12.5 UG/ML (ref 50–100)
WBC # BLD AUTO: 7.04 K/UL (ref 3.9–12.7)

## 2021-02-27 PROCEDURE — 63600175 PHARM REV CODE 636 W HCPCS: Performed by: EMERGENCY MEDICINE

## 2021-02-27 PROCEDURE — 80053 COMPREHEN METABOLIC PANEL: CPT

## 2021-02-27 PROCEDURE — 85025 COMPLETE CBC W/AUTO DIFF WBC: CPT

## 2021-02-27 PROCEDURE — 25000003 PHARM REV CODE 250: Performed by: EMERGENCY MEDICINE

## 2021-02-27 PROCEDURE — 99284 EMERGENCY DEPT VISIT MOD MDM: CPT | Mod: 25

## 2021-02-27 PROCEDURE — 96374 THER/PROPH/DIAG INJ IV PUSH: CPT

## 2021-02-27 PROCEDURE — 80164 ASSAY DIPROPYLACETIC ACD TOT: CPT

## 2021-02-27 RX ORDER — LORAZEPAM 2 MG/ML
1 INJECTION INTRAMUSCULAR
Status: COMPLETED | OUTPATIENT
Start: 2021-02-27 | End: 2021-02-27

## 2021-02-27 RX ORDER — DIVALPROEX SODIUM 500 MG/1
2000 TABLET, FILM COATED, EXTENDED RELEASE ORAL
Status: COMPLETED | OUTPATIENT
Start: 2021-02-27 | End: 2021-02-27

## 2021-02-27 RX ADMIN — LORAZEPAM 1 MG: 2 INJECTION INTRAMUSCULAR; INTRAVENOUS at 02:02

## 2021-02-27 RX ADMIN — DIVALPROEX SODIUM 2000 MG: 500 TABLET, EXTENDED RELEASE ORAL at 04:02

## 2021-03-31 ENCOUNTER — PATIENT OUTREACH (OUTPATIENT)
Dept: ADMINISTRATIVE | Facility: OTHER | Age: 21
End: 2021-03-31

## 2021-04-05 ENCOUNTER — LAB VISIT (OUTPATIENT)
Dept: LAB | Facility: HOSPITAL | Age: 21
End: 2021-04-05
Attending: PSYCHIATRY & NEUROLOGY
Payer: COMMERCIAL

## 2021-04-05 ENCOUNTER — OFFICE VISIT (OUTPATIENT)
Dept: NEUROLOGY | Facility: CLINIC | Age: 21
End: 2021-04-05
Payer: MEDICAID

## 2021-04-05 VITALS
HEIGHT: 70 IN | SYSTOLIC BLOOD PRESSURE: 119 MMHG | WEIGHT: 200.63 LBS | HEART RATE: 61 BPM | DIASTOLIC BLOOD PRESSURE: 71 MMHG | BODY MASS INDEX: 28.72 KG/M2

## 2021-04-05 DIAGNOSIS — G40.319 INTRACTABLE GENERALIZED IDIOPATHIC EPILEPSY WITHOUT STATUS EPILEPTICUS: ICD-10-CM

## 2021-04-05 DIAGNOSIS — Z51.81 THERAPEUTIC DRUG MONITORING: ICD-10-CM

## 2021-04-05 DIAGNOSIS — Z51.81 THERAPEUTIC DRUG MONITORING: Primary | ICD-10-CM

## 2021-04-05 LAB — VALPROATE SERPL-MCNC: 65.9 UG/ML (ref 50–100)

## 2021-04-05 PROCEDURE — 99999 PR PBB SHADOW E&M-EST. PATIENT-LVL III: ICD-10-PCS | Mod: PBBFAC,,, | Performed by: PSYCHIATRY & NEUROLOGY

## 2021-04-05 PROCEDURE — 99215 OFFICE O/P EST HI 40 MIN: CPT | Mod: S$GLB,,, | Performed by: PSYCHIATRY & NEUROLOGY

## 2021-04-05 PROCEDURE — 99213 OFFICE O/P EST LOW 20 MIN: CPT | Mod: PBBFAC,PO | Performed by: PSYCHIATRY & NEUROLOGY

## 2021-04-05 PROCEDURE — 36415 COLL VENOUS BLD VENIPUNCTURE: CPT | Performed by: PSYCHIATRY & NEUROLOGY

## 2021-04-05 PROCEDURE — 80164 ASSAY DIPROPYLACETIC ACD TOT: CPT | Performed by: PSYCHIATRY & NEUROLOGY

## 2021-04-05 PROCEDURE — 99999 PR PBB SHADOW E&M-EST. PATIENT-LVL III: CPT | Mod: PBBFAC,,, | Performed by: PSYCHIATRY & NEUROLOGY

## 2021-04-05 PROCEDURE — 99215 PR OFFICE/OUTPT VISIT, EST, LEVL V, 40-54 MIN: ICD-10-PCS | Mod: S$GLB,,, | Performed by: PSYCHIATRY & NEUROLOGY

## 2021-04-05 RX ORDER — DIVALPROEX SODIUM 500 MG/1
2500 TABLET, FILM COATED, EXTENDED RELEASE ORAL DAILY
Qty: 450 TABLET | Refills: 3 | Status: SHIPPED | OUTPATIENT
Start: 2021-04-05 | End: 2022-05-12

## 2021-04-05 RX ORDER — DIVALPROEX SODIUM 500 MG/1
2000 TABLET, FILM COATED, EXTENDED RELEASE ORAL DAILY
Qty: 360 TABLET | Refills: 0 | Status: SHIPPED | OUTPATIENT
Start: 2021-04-05 | End: 2021-04-05 | Stop reason: SDUPTHER

## 2021-04-16 ENCOUNTER — PATIENT MESSAGE (OUTPATIENT)
Dept: RESEARCH | Facility: HOSPITAL | Age: 21
End: 2021-04-16

## 2021-06-10 ENCOUNTER — HOSPITAL ENCOUNTER (EMERGENCY)
Facility: HOSPITAL | Age: 21
Discharge: HOME | End: 2021-06-10
Attending: EMERGENCY MEDICINE
Payer: COMMERCIAL

## 2021-06-10 VITALS
RESPIRATION RATE: 16 BRPM | HEIGHT: 69 IN | DIASTOLIC BLOOD PRESSURE: 74 MMHG | SYSTOLIC BLOOD PRESSURE: 122 MMHG | WEIGHT: 195 LBS | TEMPERATURE: 99 F | BODY MASS INDEX: 28.88 KG/M2 | HEART RATE: 66 BPM | OXYGEN SATURATION: 98 %

## 2021-06-10 DIAGNOSIS — S00.83XA CONTUSION OF FACE, INITIAL ENCOUNTER: Primary | ICD-10-CM

## 2021-06-10 PROCEDURE — 99284 EMERGENCY DEPT VISIT MOD MDM: CPT | Mod: 25

## 2021-06-10 RX ORDER — KETOROLAC TROMETHAMINE 30 MG/ML
30 INJECTION, SOLUTION INTRAMUSCULAR; INTRAVENOUS
Status: DISCONTINUED | OUTPATIENT
Start: 2021-06-10 | End: 2021-06-10 | Stop reason: HOSPADM

## 2021-06-10 RX ORDER — NAPROXEN 500 MG/1
500 TABLET ORAL EVERY 12 HOURS PRN
Qty: 20 TABLET | Refills: 0 | Status: SHIPPED | OUTPATIENT
Start: 2021-06-10

## 2021-07-21 NOTE — TELEPHONE ENCOUNTER
----- Message from Tatyana Wolfe MA sent at 3/1/2019  1:02 PM CST -----  Contact: Kimberly (mother)/[phone]      ----- Message -----  From: Sebastián Zavala  Sent: 3/1/2019  12:28 PM  To: Ranjan FARMER Staff    Patient's mother is returning your call.      Please call and advise.      
21-Jul-2021
28-Jul-2021
12-Jul-2021
21-Jul-2021
21-Jul-2021

## 2022-04-11 NOTE — ED TRIAGE NOTES
"Patient presents tot he ED via  personal transportation with mother. Patient states, " I think I got the flu, because I keep coughing and throwing up". Patient reports a nonproductive cough, nasal congestion, sore throat, body aches, fever, and vomiting since last night. Denies ear pain, abdominal pain, diarrhea, chills. Patient states that he did not check temperature with a thermometer.  " 11-Apr-2022

## 2022-05-11 DIAGNOSIS — G40.319 INTRACTABLE GENERALIZED IDIOPATHIC EPILEPSY WITHOUT STATUS EPILEPTICUS: ICD-10-CM

## 2022-05-11 RX ORDER — DIVALPROEX SODIUM 500 MG/1
TABLET, FILM COATED, EXTENDED RELEASE ORAL
Qty: 450 TABLET | Refills: 3 | OUTPATIENT
Start: 2022-05-11

## 2022-05-12 RX ORDER — DIVALPROEX SODIUM 500 MG/1
2500 TABLET, FILM COATED, EXTENDED RELEASE ORAL DAILY
Qty: 20 TABLET | Refills: 0 | Status: SHIPPED | OUTPATIENT
Start: 2022-05-12 | End: 2022-05-16 | Stop reason: SDUPTHER

## 2022-05-12 NOTE — TELEPHONE ENCOUNTER
----- Message from Fiorella Rodas sent at 5/12/2022 12:17 PM CDT -----  Pt  mother would like to be called back regarding  appt for med refill Pt out of medication    Pt can be reached at [phone]

## 2022-05-16 ENCOUNTER — OFFICE VISIT (OUTPATIENT)
Dept: NEUROLOGY | Facility: CLINIC | Age: 22
End: 2022-05-16
Payer: COMMERCIAL

## 2022-05-16 ENCOUNTER — LAB VISIT (OUTPATIENT)
Dept: LAB | Facility: HOSPITAL | Age: 22
End: 2022-05-16
Payer: COMMERCIAL

## 2022-05-16 VITALS
BODY MASS INDEX: 28.31 KG/M2 | HEART RATE: 59 BPM | HEIGHT: 70 IN | DIASTOLIC BLOOD PRESSURE: 65 MMHG | WEIGHT: 197.75 LBS | SYSTOLIC BLOOD PRESSURE: 111 MMHG

## 2022-05-16 DIAGNOSIS — Z51.81 ENCOUNTER FOR THERAPEUTIC DRUG LEVEL MONITORING: ICD-10-CM

## 2022-05-16 DIAGNOSIS — G40.319 INTRACTABLE GENERALIZED IDIOPATHIC EPILEPSY WITHOUT STATUS EPILEPTICUS: Primary | ICD-10-CM

## 2022-05-16 DIAGNOSIS — G40.319 INTRACTABLE GENERALIZED IDIOPATHIC EPILEPSY WITHOUT STATUS EPILEPTICUS: ICD-10-CM

## 2022-05-16 LAB — VALPROATE SERPL-MCNC: 112.8 UG/ML (ref 50–100)

## 2022-05-16 PROCEDURE — 80164 ASSAY DIPROPYLACETIC ACD TOT: CPT

## 2022-05-16 PROCEDURE — 99999 PR PBB SHADOW E&M-EST. PATIENT-LVL III: ICD-10-PCS | Mod: PBBFAC,,,

## 2022-05-16 PROCEDURE — 3008F PR BODY MASS INDEX (BMI) DOCUMENTED: ICD-10-PCS | Mod: CPTII,S$GLB,,

## 2022-05-16 PROCEDURE — 99215 OFFICE O/P EST HI 40 MIN: CPT | Mod: S$GLB,,,

## 2022-05-16 PROCEDURE — 99213 OFFICE O/P EST LOW 20 MIN: CPT | Mod: PBBFAC

## 2022-05-16 PROCEDURE — 3008F BODY MASS INDEX DOCD: CPT | Mod: CPTII,S$GLB,,

## 2022-05-16 PROCEDURE — 3078F PR MOST RECENT DIASTOLIC BLOOD PRESSURE < 80 MM HG: ICD-10-PCS | Mod: CPTII,S$GLB,,

## 2022-05-16 PROCEDURE — 99999 PR PBB SHADOW E&M-EST. PATIENT-LVL III: CPT | Mod: PBBFAC,,,

## 2022-05-16 PROCEDURE — 36415 COLL VENOUS BLD VENIPUNCTURE: CPT

## 2022-05-16 PROCEDURE — 1159F PR MEDICATION LIST DOCUMENTED IN MEDICAL RECORD: ICD-10-PCS | Mod: CPTII,S$GLB,,

## 2022-05-16 PROCEDURE — 3074F SYST BP LT 130 MM HG: CPT | Mod: CPTII,S$GLB,,

## 2022-05-16 PROCEDURE — 1159F MED LIST DOCD IN RCRD: CPT | Mod: CPTII,S$GLB,,

## 2022-05-16 PROCEDURE — 3078F DIAST BP <80 MM HG: CPT | Mod: CPTII,S$GLB,,

## 2022-05-16 PROCEDURE — 99215 PR OFFICE/OUTPT VISIT, EST, LEVL V, 40-54 MIN: ICD-10-PCS | Mod: S$GLB,,,

## 2022-05-16 PROCEDURE — 3074F PR MOST RECENT SYSTOLIC BLOOD PRESSURE < 130 MM HG: ICD-10-PCS | Mod: CPTII,S$GLB,,

## 2022-05-16 RX ORDER — DIVALPROEX SODIUM 500 MG/1
2500 TABLET, FILM COATED, EXTENDED RELEASE ORAL DAILY
Qty: 150 TABLET | Refills: 11 | Status: SHIPPED | OUTPATIENT
Start: 2022-05-16 | End: 2022-07-17

## 2022-05-16 NOTE — PROGRESS NOTES
[facility] - NEUROLOGY EPILEPSY  [facility]    Date: 5/16/22  Patient Name: Teodora Sanchez   MRN: [id number]   PCP: Shayna Dubose  Referring Provider: No ref. provider found    Assessment:   This is Teodora Sanchez, 21 y.o. male presenting in follow-up for primary generalized epilepsy. Patient is on a current medication regimen of VPA 2500mg nightly though seizures are currently poorly controlled secondary to frequent missed doses of medication. Level today. If patient experiences breakthrough events while consistently taking medication, will make ASM adjustments at that time. Thoroughly counseled patient on importance of medication compliance and discussed increased risk of SUDEP if his convulsive seizures are not well controlled. Patient verbalized understanding though did not appear to take this information seriously. Discussed ways to increase compliance including setting an alarm on his phone and obtaining a pill minder. Also discussed sleep hygiene and importance of getting good sleep at night. Advised patient to reach out over the portal with any breakthrough events. No driving. Patient is comfortable with plan and verbalizes agreement. All questions and concerns are addressed at this time. Follow up in 6 months or sooner with issues.      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 I recommended seizure precautions with regards to avoiding unsupervised water recreational activity or bathing in tubs, climbing or working at heights, operation of  heavy or dangerous machinery, caution around fire and sources of high heat, as well as any other activity which could put a patient at danger in case of a seizure.  I also reviewed the [place] DMV law and recommended that the patient not drive until seizure free for 6 months.    Mi Caruso PA-C   Neurology-Epilepsy  [facility]    Collaborating physician, Dr. Charles Woodruff, was available during today's encounter.     I spent approximately 45 minutes on the day of this encounter preparing to see the patient, obtaining and reviewing history and results, performing a medically appropriate exam, counseling and educating the patient/family/caregiver, documenting clinical information, coordinating care, and ordering medications, tests, procedures, and referrals.      Patient note was created using MModal Dictation.  Any errors in syntax or even information may not have been identified and edited on initial review prior to signing this note.  Subjective:     CC: Epilepsy    Interval Events/ROS 5/16/2022:    Patient presents today accompanied by mom and brother. Last seizure was two weeks ago. Associated tongue bite. Patient is on a current medication regimen of VPA 2500mg nightly. Reports he had been taking his medication when this most recent event occurred though admits frequent missed doses of medication. Estimates he had over fifty GTCs last year due to not being compliant with his medication. Denies any specific reason why he misses doses of medication. Getting poor sleep. Denies anxiety, states he gets distracted and stays up late watching tv. Otherwise, no fever, no cold symptoms, no headache, no changes in vision, no new weakness, no chest pain, no shortness of breath, no nausea, no vomiting, no diarrhea, no constipation, no tingling/numbness, no problems walking, no mood issues. Frequently laughing and joking with brother throughout visit.     HPI:   Mr. Teodora Sanchez is a 21 y.o. male  "presenting in follow-up for epilepsy.  Patient presents today with his mother who contributes to the history.  Patient continues to experience breakthrough seizures with variable frequency as recently as yesterday.  The patient continues to exhibit poor compliance with his medications and while he states that he is compliant with his medications, his mother indicates otherwise.  He denies any side effects.  He continues to smoke marijuana.  He spends much of the visit laughing and joking and is disengage.  He states that his primary reason for coming to today's visit was to get on disability because he has a baby on the way with a girlfriend in [place].    Seizure Type: Primary generalized  Seizure Etiology: Unknown  Current AEDs: Depakote 2000 mg ER daily    The patient is accompanied by family who contribute to the history. This patient has 2 types of seizure as described below. The patient reports having seizures for years. The patient reports to have stable seizure control. The seizure frequency is approximately monthly. The last seizure was 4/5/20. The patient reports no side effects from seizure medication.     Seizure Type 1:   Seizure Description: Blank stare for a few minutes   Aura: No warning  Associated Symptoms: None  Seizure Frequency: Daily  Last seizure: Unclear    Seizure Type 2:   Seizure Description: hands curl in, jerks all over, eyes flutter, postictal confusions  Aura: None  Associated Symptoms: Bites tongue, rare urinary incontinence   Seizure Frequency: Up to weekly depending on compliance  Last seizure: 4/5/21    Handedness: Right handed  Seizure Triggers/ Provoking Features: alcohol  consumption, sleep deprivation, stress and missed medications   Seizure Onset Age: ~5th-6th grade  Seizure/ Epilepsy Risk Factors: Childhood epilepsy, mother with childhood epilepsy  Birth/Developmental History: Was in NICU "tailbone wasn't connected." Normal developmental history.   Previous Seizure " "Medications: VPA, Ethosuxamide, LTG  Other Treatments: None  Episodes of Status: ~Age 13 (Cluster of 8 sz, needed repeated IV meds)  Recent Med Changes: None  Psychiatric/Behavioral Comorbitidies: Poor medication compliance  Surgical Candidacy: N/A    Prior Studies:  EEG : 2013- 3 Hz spike and wave, 6/2018- Ambulatory Same with one generalized sz captured  vEEG/ EMU evaluation: Not done  MRI of brain: L temporal MTS, 2/27/21, personally reviewed  Other studies:Not done  AED levels: VPA, 62.8 on 10/30/18, 41.4 on 9/5/18, 28.2 8/8/18  CT/CTA Scan: Not done  PET Scan:Not done  Neuropsychological Evaluation: Not done  DEXA Scan: Not done    PAST MEDICAL HISTORY:  Past Medical History:   Diagnosis Date    Asthma     Psychiatric problem     Seizures        PAST SURGICAL HISTORY:  Past Surgical History:   Procedure Laterality Date    CYST REMOVAL      leg     CURRENT MEDS:  Current Outpatient Medications   Medication Sig Dispense Refill    divalproex ER (DEPAKOTE) 500 MG Tb24 Take 5 tablets (2,500 mg total) by mouth once daily. ABSOLUTELY NO FURTHER REFILL WITHOUT APT 20 tablet 0    naproxen (NAPROSYN) 500 MG tablet Take 1 tablet (500 mg total) by mouth every 12 (twelve) hours as needed (Pain). 20 tablet 0     No current facility-administered medications for this visit.     ALLERGIES:  Review of patient's allergies indicates:  No Known Allergies    FAMILY HISTORY:  Family History   Problem Relation Age of Onset    Hyperlipidemia Mother     No Known Problems Father        SOCIAL HISTORY:  Social History     Tobacco Use    Smoking status: Never Smoker    Smokeless tobacco: Never Used   Substance Use Topics    Alcohol use: Yes    Drug use: Yes     Types: Marijuana       Review of Systems:  12 review of systems is negative except for the symptoms mentioned in HPI.        Objective:     Vitals:    05/16/22 1500   BP: 111/65   Pulse: (!) 59   Weight: 89.7 kg (197 lb 12 oz)   Height: 5' 10" (1.778 m)     General: NAD, " well nourished   Eyes: no tearing, discharge, no erythema   ENT: moist mucous membranes of the oral cavity, nares patent    Neck: Supple, Full range of motion  Cardiovascular: Warm and well perfused  Lungs: Normal work of breathing, normal chest wall excursions  Skin: No rash, lesions, or breakdown on exposed skin  Psychiatry: Mood and affect are appropriate   Abdomen: soft, non tender, non distended  Extremeties: No cyanosis, clubbing or edema.    Neurological   MENTAL STATUS: Alert and oriented to person, place, and time. Attention and concentration within normal limits. Speech without dysarthria. Recent and remote memory within normal limits   CRANIAL NERVES: Visual fields intact. EOMI. Facial sensation intact. Face symmetrical. Hearing grossly intact. Full shoulder shrug bilaterally. Tongue protrudes midline   SENSORY: Sensation is intact to light touch throughout.  MOTOR: 5/5 deltoid, biceps, triceps, interosseous, hand  bilaterally. 5/5 iliopsoas, knee extension/flexion, foot dorsi/plantarflexion bilaterally.    CEREBELLAR/COORDINATION/GAIT: Gait steady with normal arm swing and stride length.Finger to nose intact. Romberg negative.

## 2022-05-30 ENCOUNTER — PATIENT MESSAGE (OUTPATIENT)
Dept: ADMINISTRATIVE | Facility: HOSPITAL | Age: 22
End: 2022-05-30
Payer: MEDICAID

## 2023-03-14 DIAGNOSIS — G40.319 INTRACTABLE GENERALIZED IDIOPATHIC EPILEPSY WITHOUT STATUS EPILEPTICUS: ICD-10-CM

## 2023-03-14 RX ORDER — DIVALPROEX SODIUM 500 MG/1
2500 TABLET, FILM COATED, EXTENDED RELEASE ORAL DAILY
Qty: 450 TABLET | Refills: 3 | Status: SHIPPED | OUTPATIENT
Start: 2023-03-14 | End: 2024-03-14

## 2023-03-14 NOTE — TELEPHONE ENCOUNTER
Called and left patient a voice mail trying to schedule a follow up with Dr. Caruso. Asked him to call back at his convenience.

## 2023-03-14 NOTE — TELEPHONE ENCOUNTER
TAKE 5 TABLETS (2,500 MG TOTAL) BY MOUTH ONCE DAILY.     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 DX Code Needed.    Anticonvulsants Protocol Failed 03/14/2023 01:34 PM   Protocol Details  Visit with Authorizing provider in past 9 months or upcoming 90 days      This request has changes from the previous prescription.   To be filled at: [facility]/pharmacy #[id number] - [place] - [address] [facility]

## 2023-11-02 PROCEDURE — 87502 INFLUENZA DNA AMP PROBE: CPT

## 2023-11-02 PROCEDURE — 99284 EMERGENCY DEPT VISIT MOD MDM: CPT

## 2023-11-03 ENCOUNTER — HOSPITAL ENCOUNTER (EMERGENCY)
Facility: HOSPITAL | Age: 23
Discharge: HOME | End: 2023-11-03
Attending: STUDENT IN AN ORGANIZED HEALTH CARE EDUCATION/TRAINING PROGRAM
Payer: MEDICAID

## 2023-11-03 VITALS
SYSTOLIC BLOOD PRESSURE: 115 MMHG | BODY MASS INDEX: 28.88 KG/M2 | OXYGEN SATURATION: 98 % | WEIGHT: 195 LBS | DIASTOLIC BLOOD PRESSURE: 62 MMHG | TEMPERATURE: 99 F | RESPIRATION RATE: 16 BRPM | HEART RATE: 91 BPM | HEIGHT: 69 IN

## 2023-11-03 DIAGNOSIS — J06.9 VIRAL URI WITH COUGH: Primary | ICD-10-CM

## 2023-11-03 LAB
CTP QC/QA: YES
CTP QC/QA: YES
POC MOLECULAR INFLUENZA A AGN: NEGATIVE
POC MOLECULAR INFLUENZA B AGN: NEGATIVE
SARS-COV-2 RDRP RESP QL NAA+PROBE: NEGATIVE

## 2023-11-03 PROCEDURE — 87635 SARS-COV-2 COVID-19 AMP PRB: CPT

## 2023-11-03 PROCEDURE — 25000003 PHARM REV CODE 250

## 2023-11-03 RX ORDER — FLUTICASONE PROPIONATE 50 MCG
1 SPRAY, SUSPENSION (ML) NASAL 2 TIMES DAILY PRN
Qty: 15 G | Refills: 0 | Status: SHIPPED | OUTPATIENT
Start: 2023-11-03

## 2023-11-03 RX ORDER — BENZONATATE 100 MG/1
100 CAPSULE ORAL 3 TIMES DAILY PRN
Qty: 30 CAPSULE | Refills: 0 | Status: SHIPPED | OUTPATIENT
Start: 2023-11-03

## 2023-11-03 RX ORDER — NAPROXEN 500 MG/1
500 TABLET ORAL 2 TIMES DAILY PRN
Qty: 30 TABLET | Refills: 0 | Status: SHIPPED | OUTPATIENT
Start: 2023-11-03

## 2023-11-03 RX ORDER — NAPROXEN 500 MG/1
500 TABLET ORAL
Status: COMPLETED | OUTPATIENT
Start: 2023-11-03 | End: 2023-11-03

## 2023-11-03 RX ORDER — CETIRIZINE HYDROCHLORIDE 10 MG/1
10 TABLET ORAL DAILY PRN
Qty: 30 TABLET | Refills: 0 | Status: SHIPPED | OUTPATIENT
Start: 2023-11-03

## 2023-11-03 RX ORDER — GUAIFENESIN 100 MG/5ML
100-200 SOLUTION ORAL EVERY 4 HOURS PRN
Qty: 118 ML | Refills: 0 | Status: SHIPPED | OUTPATIENT
Start: 2023-11-03

## 2023-11-03 RX ADMIN — NAPROXEN 500 MG: 500 TABLET ORAL at 12:11

## 2023-11-03 NOTE — Clinical Note
"Teodora Camacho" Daniel was seen and treated in our emergency department on 11/2/2023.  He may return to work on 11/04/2023.       If you have any questions or concerns, please don't hesitate to call.      [place], Alayna, PA-C"

## 2023-11-03 NOTE — DISCHARGE INSTRUCTIONS

## 2023-11-03 NOTE — ED PROVIDER NOTES
Encounter Date: 11/2/2023       History     Chief Complaint   Patient presents with    Cough     Pt chief complaint is a Cough. Pt states began having a cough yesterday and is concerned he has COVID.      23-year-old male with past medical history of asthma, seizures, and psychiatric problems presents to ED for cough.  Patient states it started last night.  Patient has not taken anything to make this better.  Patient denies any sick contacts.  Patient also admits to chills, congestion, runny nose, and headache.  Patient denies fever, sweats, sore throat, shortness of breath, chest pain, nausea, vomiting, diarrhea, urinary symptoms, dizziness, lightheadedness, and rashes      Review of patient's allergies indicates:  No Known Allergies  Past Medical History:   Diagnosis Date    Asthma     Psychiatric problem     Seizures      Past Surgical History:   Procedure Laterality Date    CYST REMOVAL      leg     Family History   Problem Relation Age of Onset    Hyperlipidemia Mother     No Known Problems Father      Social History     Tobacco Use    Smoking status: Never    Smokeless tobacco: Never   Substance Use Topics    Alcohol use: Yes    Drug use: Yes     Types: Marijuana     Review of Systems   Constitutional:  Positive for chills. Negative for diaphoresis and fever.   HENT:  Positive for congestion and rhinorrhea. Negative for sore throat.    Respiratory:  Positive for cough. Negative for shortness of breath.    Cardiovascular:  Negative for chest pain.   Gastrointestinal:  Negative for constipation, diarrhea, nausea and vomiting.   Genitourinary:  Negative for decreased urine volume, difficulty urinating, dysuria, frequency and urgency.   Musculoskeletal:  Negative for arthralgias and myalgias.   Skin:  Negative for rash.   Neurological:  Positive for headaches. Negative for dizziness, weakness and light-headedness.       Physical Exam     Initial Vitals [11/03/23 0000]   BP Pulse Resp Temp SpO2   (!) 140/68 98 16  99.1 °F (37.3 °C) 99 %      MAP       --         Physical Exam    Nursing note and vitals reviewed.  Constitutional: He appears well-developed and well-nourished. He is not diaphoretic. He is active. He does not appear ill. No distress.   HENT:   Head: Normocephalic and atraumatic.   Right Ear: External ear normal.   Left Ear: External ear normal.   Nose: Nose normal.   Mouth/Throat: Uvula is midline, oropharynx is clear and moist and mucous membranes are normal.   Eyes: Conjunctivae, EOM and lids are normal. Pupils are equal, round, and reactive to light. Right eye exhibits no discharge. Left eye exhibits no discharge. No scleral icterus.   Neck: Phonation normal. Neck supple.   Normal range of motion.   Full passive range of motion without pain.     Cardiovascular:  Normal rate and regular rhythm.           Pulmonary/Chest: Effort normal and breath sounds normal. No respiratory distress.   Abdominal: He exhibits no distension.   Musculoskeletal:         General: Normal range of motion.      Cervical back: Full passive range of motion without pain, normal range of motion and neck supple.     Neurological: He is alert and oriented to person, place, and time. He has normal strength. GCS eye subscore is 4. GCS verbal subscore is 5. GCS motor subscore is 6.   Skin: Skin is dry. Capillary refill takes less than 2 seconds.         ED Course   Procedures  Labs Reviewed   SARS-COV-2 RDRP GENE   POCT INFLUENZA A/B MOLECULAR          Imaging Results    None          Medications   naproxen tablet 500 mg (has no administration in time range)     Medical Decision Making  23-year-old male with past medical history of asthma, seizures, and psychiatric problems presents to ED for cough.  Patient's chart and medical history reviewed.    Ddx:  COVID  Flu  Strep throat  Viral URI    Patient's vitals reviewed.  Afebrile, no respiratory distress, and nontoxic-appearing in the ED. patient's physical exam is unremarkable.  Patient given  naproxen for his headache.  Patient is COVID and flu negative. Discussed with patient this is most likely a viral upper respiratory infection which will take time to clear from his system.  Discussed with patient to stay well rested and hydrated. Patient given will be sent home on naproxen, Flonase, Zyrtec, Tessalon Perles, benzocaine lozenges, and guaifenesin cough syrup for symptomatic control.  Patient agrees with this plan. Discussed with him strict return precautions, he verbalized understanding. Patient is stable for discharge.       Amount and/or Complexity of Data Reviewed  Labs: ordered.    Risk  OTC drugs.  Prescription drug management.                               Clinical Impression:   Final diagnoses:  [J06.9] Viral URI with cough (Primary)        ED Disposition Condition    Discharge Stable          ED Prescriptions       Medication Sig Dispense Start Date End Date Auth. Provider    benzocaine-menthoL 6-10 mg lozenge Take 1 lozenge by mouth every 2 (two) hours as needed for Pain (sore throat). 18 tablet 11/3/2023 -- Holdsworth, Alayna, PA-C    guaiFENesin 100 mg/5 ml (ROBITUSSIN) 100 mg/5 mL syrup Take 5-10 mLs (100-200 mg total) by mouth every 4 (four) hours as needed for Cough or Congestion. 118 mL 11/3/2023 -- Holdsworth, Alayna, PA-C    cetirizine (ZYRTEC) 10 MG tablet Take 1 tablet (10 mg total) by mouth daily as needed for Allergies or Rhinitis. 30 tablet 11/3/2023 -- Holdsworth, Alayna, PA-C    fluticasone propionate (FLONASE) 50 mcg/actuation nasal spray 1 spray (50 mcg total) by Each Nostril route 2 (two) times daily as needed for Rhinitis or Allergies. 15 g 11/3/2023 -- Holdsworth, Alayna, PA-C    benzonatate (TESSALON) 100 MG capsule Take 1 capsule (100 mg total) by mouth 3 (three) times daily as needed for Cough. 30 capsule 11/3/2023 -- Holdsworth, Alayna, PA-C    naproxen (NAPROSYN) 500 MG tablet Take 1 tablet (500 mg total) by mouth 2 (two) times daily as needed (Pain). 30 tablet  11/3/2023 -- Holdsworth, Alayna, PA-C          Follow-up Information       Follow up With Specialties Details Why Contact Info    Shayna Dubose MD Internal Medicine, Wound Care   [address] [address]  [address]  [place] [id number] [phone]               Holdsworth, Alayna, PA-C  11/03/23 0034

## 2024-05-30 ENCOUNTER — HOSPITAL ENCOUNTER (EMERGENCY)
Facility: HOSPITAL | Age: 24
Discharge: HOME | End: 2024-05-31
Attending: EMERGENCY MEDICINE
Payer: MEDICAID

## 2024-05-30 DIAGNOSIS — M54.9 BACK PAIN, UNSPECIFIED BACK LOCATION, UNSPECIFIED BACK PAIN LATERALITY, UNSPECIFIED CHRONICITY: Primary | ICD-10-CM

## 2024-05-30 DIAGNOSIS — R56.9 SEIZURE: ICD-10-CM

## 2024-05-30 LAB
ALBUMIN SERPL BCP-MCNC: 4.3 G/DL (ref 3.5–5.2)
ALP SERPL-CCNC: 70 U/L (ref 55–135)
ALT SERPL W/O P-5'-P-CCNC: 14 U/L (ref 10–44)
ANION GAP SERPL CALC-SCNC: 10 MMOL/L (ref 8–16)
AST SERPL-CCNC: 19 U/L (ref 10–40)
BASOPHILS # BLD AUTO: 0.02 K/UL (ref 0–0.2)
BASOPHILS NFR BLD: 0.3 % (ref 0–1.9)
BILIRUB SERPL-MCNC: 0.5 MG/DL (ref 0.1–1)
BUN SERPL-MCNC: 7 MG/DL (ref 6–20)
CALCIUM SERPL-MCNC: 9.8 MG/DL (ref 8.7–10.5)
CHLORIDE SERPL-SCNC: 106 MMOL/L (ref 95–110)
CK SERPL-CCNC: 433 U/L (ref 20–200)
CO2 SERPL-SCNC: 25 MMOL/L (ref 23–29)
CREAT SERPL-MCNC: 0.8 MG/DL (ref 0.5–1.4)
DIFFERENTIAL METHOD BLD: ABNORMAL
EOSINOPHIL # BLD AUTO: 0.1 K/UL (ref 0–0.5)
EOSINOPHIL NFR BLD: 1.4 % (ref 0–8)
ERYTHROCYTE [DISTWIDTH] IN BLOOD BY AUTOMATED COUNT: 12.9 % (ref 11.5–14.5)
EST. GFR  (NO RACE VARIABLE): >60 ML/MIN/1.73 M^2
GLUCOSE SERPL-MCNC: 83 MG/DL (ref 70–110)
HCT VFR BLD AUTO: 41.5 % (ref 40–54)
HGB BLD-MCNC: 13.3 G/DL (ref 14–18)
IMM GRANULOCYTES # BLD AUTO: 0.03 K/UL (ref 0–0.04)
IMM GRANULOCYTES NFR BLD AUTO: 0.4 % (ref 0–0.5)
LYMPHOCYTES # BLD AUTO: 1.9 K/UL (ref 1–4.8)
LYMPHOCYTES NFR BLD: 26.4 % (ref 18–48)
MCH RBC QN AUTO: 29.4 PG (ref 27–31)
MCHC RBC AUTO-ENTMCNC: 32 G/DL (ref 32–36)
MCV RBC AUTO: 92 FL (ref 82–98)
MONOCYTES # BLD AUTO: 0.7 K/UL (ref 0.3–1)
MONOCYTES NFR BLD: 9.9 % (ref 4–15)
NEUTROPHILS # BLD AUTO: 4.4 K/UL (ref 1.8–7.7)
NEUTROPHILS NFR BLD: 61.6 % (ref 38–73)
NRBC BLD-RTO: 0 /100 WBC
PLATELET # BLD AUTO: 224 K/UL (ref 150–450)
PMV BLD AUTO: 10.6 FL (ref 9.2–12.9)
POCT GLUCOSE: 95 MG/DL (ref 70–110)
POTASSIUM SERPL-SCNC: 4.6 MMOL/L (ref 3.5–5.1)
PROT SERPL-MCNC: 8 G/DL (ref 6–8.4)
RBC # BLD AUTO: 4.52 M/UL (ref 4.6–6.2)
SODIUM SERPL-SCNC: 141 MMOL/L (ref 136–145)
VALPROATE SERPL-MCNC: <12.5 UG/ML (ref 50–100)
WBC # BLD AUTO: 7.08 K/UL (ref 3.9–12.7)

## 2024-05-30 PROCEDURE — 81003 URINALYSIS AUTO W/O SCOPE: CPT | Performed by: EMERGENCY MEDICINE

## 2024-05-30 PROCEDURE — 80164 ASSAY DIPROPYLACETIC ACD TOT: CPT

## 2024-05-30 PROCEDURE — 99285 EMERGENCY DEPT VISIT HI MDM: CPT | Mod: 25

## 2024-05-30 PROCEDURE — 80053 COMPREHEN METABOLIC PANEL: CPT

## 2024-05-30 PROCEDURE — 63600175 PHARM REV CODE 636 W HCPCS: Performed by: EMERGENCY MEDICINE

## 2024-05-30 PROCEDURE — 96375 TX/PRO/DX INJ NEW DRUG ADDON: CPT

## 2024-05-30 PROCEDURE — 96374 THER/PROPH/DIAG INJ IV PUSH: CPT

## 2024-05-30 PROCEDURE — 82550 ASSAY OF CK (CPK): CPT

## 2024-05-30 PROCEDURE — 85025 COMPLETE CBC W/AUTO DIFF WBC: CPT

## 2024-05-30 PROCEDURE — 25000003 PHARM REV CODE 250: Performed by: EMERGENCY MEDICINE

## 2024-05-30 PROCEDURE — 82962 GLUCOSE BLOOD TEST: CPT

## 2024-05-30 PROCEDURE — 96361 HYDRATE IV INFUSION ADD-ON: CPT

## 2024-05-30 RX ORDER — LORAZEPAM 2 MG/ML
0.5 INJECTION INTRAMUSCULAR
Status: COMPLETED | OUTPATIENT
Start: 2024-05-30 | End: 2024-05-30

## 2024-05-30 RX ORDER — LIDOCAINE 50 MG/G
1 PATCH TOPICAL
Status: DISCONTINUED | OUTPATIENT
Start: 2024-05-30 | End: 2024-05-31 | Stop reason: HOSPADM

## 2024-05-30 RX ORDER — DIVALPROEX SODIUM 500 MG/1
2500 TABLET, FILM COATED, EXTENDED RELEASE ORAL
Status: COMPLETED | OUTPATIENT
Start: 2024-05-30 | End: 2024-05-30

## 2024-05-30 RX ORDER — KETOROLAC TROMETHAMINE 30 MG/ML
10 INJECTION, SOLUTION INTRAMUSCULAR; INTRAVENOUS
Status: COMPLETED | OUTPATIENT
Start: 2024-05-30 | End: 2024-05-30

## 2024-05-30 RX ADMIN — KETOROLAC TROMETHAMINE 10 MG: 30 INJECTION, SOLUTION INTRAMUSCULAR at 09:05

## 2024-05-30 RX ADMIN — LIDOCAINE 5% 1 PATCH: 700 PATCH TOPICAL at 11:05

## 2024-05-30 RX ADMIN — SODIUM CHLORIDE, SODIUM LACTATE, POTASSIUM CHLORIDE, AND CALCIUM CHLORIDE 1000 ML: .6; .31; .03; .02 INJECTION, SOLUTION INTRAVENOUS at 10:05

## 2024-05-30 RX ADMIN — LORAZEPAM 0.5 MG: 2 INJECTION INTRAMUSCULAR; INTRAVENOUS at 09:05

## 2024-05-30 RX ADMIN — DIVALPROEX SODIUM 2500 MG: 500 TABLET, EXTENDED RELEASE ORAL at 11:05

## 2024-05-31 VITALS
DIASTOLIC BLOOD PRESSURE: 62 MMHG | SYSTOLIC BLOOD PRESSURE: 123 MMHG | BODY MASS INDEX: 27.47 KG/M2 | TEMPERATURE: 98 F | WEIGHT: 186 LBS | OXYGEN SATURATION: 100 % | HEART RATE: 59 BPM | RESPIRATION RATE: 21 BRPM

## 2024-05-31 LAB
BILIRUB UR QL STRIP: NEGATIVE
CLARITY UR: CLEAR
COLOR UR: YELLOW
GLUCOSE UR QL STRIP: NEGATIVE
HGB UR QL STRIP: NEGATIVE
KETONES UR QL STRIP: NEGATIVE
LEUKOCYTE ESTERASE UR QL STRIP: NEGATIVE
NITRITE UR QL STRIP: NEGATIVE
PH UR STRIP: 6 [PH] (ref 5–8)
PROT UR QL STRIP: ABNORMAL
SP GR UR STRIP: 1.02 (ref 1–1.03)
URN SPEC COLLECT METH UR: ABNORMAL
UROBILINOGEN UR STRIP-ACNC: NEGATIVE EU/DL

## 2024-05-31 RX ORDER — NAPROXEN 500 MG/1
500 TABLET ORAL 2 TIMES DAILY WITH MEALS
Qty: 20 TABLET | Refills: 0 | Status: SHIPPED | OUTPATIENT
Start: 2024-05-31

## 2024-05-31 RX ORDER — METHOCARBAMOL 500 MG/1
1000 TABLET, FILM COATED ORAL 3 TIMES DAILY
Qty: 30 TABLET | Refills: 0 | Status: SHIPPED | OUTPATIENT
Start: 2024-05-31 | End: 2024-06-05

## 2024-05-31 RX ORDER — LIDOCAINE 50 MG/G
1 PATCH TOPICAL DAILY PRN
Qty: 10 PATCH | Refills: 0 | Status: SHIPPED | OUTPATIENT
Start: 2024-05-31 | End: 2024-06-10

## 2024-05-31 NOTE — ED PROVIDER NOTES
Encounter Date: 5/30/2024    SCRIBE #1 NOTE: I, Alethea Jaeger, am scribing for, and in the presence of,  Alecia Baldwin MD.       History     Chief Complaint   Patient presents with    Seizures     Pt with hx of epilepsy reports seizure like activity while in a bar. Unknown length of time. Non compliant with medications. Pt AAO x4 at present. Pt fell out of a chair while seizure was happening. Reports head, neck and back pain      Teodora Sanchez is a 23 y.o. 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 Patient reports he was at a bar with his grandfather boiling seafood when he had a seizure witnessed by his grandfather, who told him he was shaking all over and making noises. Reports he fell to the floor from a barstool, and hurt his back. Reports he bit the right side of his tongue. Reports he had a headache immediately after seizure which has since resolved. Reports he was very tired and slept for hours afterwards PTA. Reports his neck and entire back hurt. Reports his entire body hurts when he walks. No other exacerbating or alleviating factors. Denies unilateral numbness/weakness, urinary or bowel incontinence, nausea, vomiting, diarrhea, constipation, difficulty urinating or other associated symptoms. Reports he takes 6 tablets of depakote each morning for his seizures. Reports they are prescribed by Dr. Woodruff with neurology, but he has not seen him in several years. (Per chart review patient was fired from their clinic in 2022 for nonadherence to medication and not coming to clinic visits).  Denies any further daily medications or health problems. Pt reports he has not taken his Depakote for 3 days because he has been staying at a friends house. Reports staying at his friends house because they had a party 2 d/a. Reports he has been getting  "enough sleep in the past several days. Reports last EtOH consumption was about 1 week ago, states he "hardly drinks at all". Admits to smoking cigarettes (about 1 pack over 3 days). Denies recreational drug use. Reports PSHx of cyst removal only. NKDA.     The history is provided by the patient. No  was used.     Review of patient's allergies indicates:  No Known Allergies  Past Medical History:   Diagnosis Date    Asthma     Psychiatric problem     Seizures      Past Surgical History:   Procedure Laterality Date    CYST REMOVAL      leg     Family History   Problem Relation Name Age of Onset    Hyperlipidemia Mother      No Known Problems Father       Social History     Tobacco Use    Smoking status: Every Day     Current packs/day: 0.50     Types: Cigarettes    Smokeless tobacco: Never   Substance Use Topics    Alcohol use: Yes    Drug use: Yes     Types: Marijuana     Review of Systems   Constitutional:  Positive for fatigue. Negative for chills, diaphoresis and fever.   HENT:  Negative for drooling, sore throat and voice change.    Eyes:  Negative for photophobia and visual disturbance.   Respiratory:  Negative for cough, shortness of breath and wheezing.    Cardiovascular:  Negative for chest pain and leg swelling.   Gastrointestinal:  Negative for abdominal pain, blood in stool, constipation, diarrhea, nausea and vomiting.   Genitourinary:  Negative for difficulty urinating, dysuria, frequency, hematuria and urgency.   Musculoskeletal:  Positive for back pain, myalgias and neck pain. Negative for neck stiffness.   Skin:  Positive for wound (tongue, right). Negative for rash.   Neurological:  Positive for seizures and headaches (resolved). Negative for syncope, weakness, light-headedness and numbness.        (-) bladder or bowel incontinence   Hematological:  Does not bruise/bleed easily.   Psychiatric/Behavioral:  Negative for agitation, confusion and suicidal ideas.    All other systems " reviewed and are negative.      Physical Exam     Initial Vitals [05/30/24 1747]   BP Pulse Resp Temp SpO2   131/62 66 18 98.8 °F (37.1 °C) 100 %      MAP       --         Physical Exam    Nursing note and vitals reviewed.  Constitutional: He appears well-developed and well-nourished. He is not diaphoretic. No distress.   HENT:   Head: Normocephalic and atraumatic.   Right Ear: External ear normal.   Left Ear: External ear normal.   Mouth/Throat: Oropharynx is clear and moist. No oropharyngeal exudate.   Small abrasion to right tongue.      Eyes: Conjunctivae and EOM are normal. Pupils are equal, round, and reactive to light. Right eye exhibits no discharge. Left eye exhibits no discharge.   Neck: Neck supple. No JVD present.   Normal range of motion.  Cardiovascular:  Normal rate, regular rhythm, normal heart sounds and intact distal pulses.     Exam reveals no gallop and no friction rub.       No murmur heard.  Pulmonary/Chest: Breath sounds normal. No respiratory distress. He has no wheezes. He has no rhonchi. He has no rales.   Abdominal: Abdomen is soft. Bowel sounds are normal. He exhibits no distension. There is no abdominal tenderness. There is no rebound and no guarding.   Musculoskeletal:         General: No tenderness or edema. Normal range of motion.      Cervical back: Normal range of motion and neck supple.      Comments: Pain with ROM of LLE to back but negative SLR bilaterally. Right paraspinal cervical and thoracic tenderness. No paraspinal lumbar tenderness. No C/T/L midline tenderness. FROM of right arm without pain.      Lymphadenopathy:     He has no cervical adenopathy.   Neurological: He is alert and oriented to person, place, and time. He has normal strength. No cranial nerve deficit or sensory deficit. GCS score is 15. GCS eye subscore is 4. GCS verbal subscore is 5. GCS motor subscore is 6.   Moves all extremities and carries on conversation. CN- II: PERRL; III/IV/VI: EOMI w/out evidence of  nystagmus; V: no deficits appreciated to light touch bilateral face; VII: no facial weakness, no facial asymmetry. Eyebrow raise symmetric. Smile symmetric; IX/X: palate midline, and raises symmetrically; XI: shoulder shrug 5/5 bilaterally; XII: tongue is midline w/out asymmetry. Strength 5/5 to bilateral upper and lower extremities, sensation intact to light touch,     Skin: Skin is warm and dry. Capillary refill takes less than 2 seconds.   Psychiatric: He has a normal mood and affect. Thought content normal.         ED Course   Procedures  Labs Reviewed   CBC W/ AUTO DIFFERENTIAL - Abnormal; Notable for the following components:       Result Value    RBC 4.52 (*)     Hemoglobin 13.3 (*)     All other components within normal limits   VALPROIC ACID - Abnormal; Notable for the following components:    Valproic Acid Level <12.5 (*)     All other components within normal limits   URINALYSIS, REFLEX TO URINE CULTURE - Abnormal; Notable for the following components:    Protein, UA Trace (*)     All other components within normal limits    Narrative:     Specimen Source->Urine   CK - Abnormal; Notable for the following components:     (*)     All other components within normal limits   COMPREHENSIVE METABOLIC PANEL   CK   POCT GLUCOSE          Imaging Results              X-Ray Lumbar Spine Ap And Lateral (Final result)  Result time 05/30/24 23:23:32      Final result by Zeb Cazares MD (05/30/24 23:23:32)                   Impression:      No acute lumbar spine abnormalities identified.      Electronically signed by: Zeb Cazares MD  Date:    05/30/2024  Time:    23:23               Narrative:    EXAMINATION:  XR LUMBAR SPINE AP AND LATERAL    CLINICAL HISTORY:  fall;    TECHNIQUE:  AP, lateral and spot images were performed of the lumbar spine.    COMPARISON:  None    FINDINGS:  Lumbar spine alignment is within normal limits.  No evidence of acute lumbar spine fracture or subluxation.  Intervertebral  disc spaces appear fairly well maintained.  Visualized sacrum is unremarkable.                                       X-Ray Thoracic Spine AP Lateral (Final result)  Result time 05/30/24 21:45:11      Final result by Zeb Cazares MD (05/30/24 21:45:11)                   Impression:      No acute thoracic spine abnormalities identified.      Electronically signed by: Zeb Cazares MD  Date:    05/30/2024  Time:    21:45               Narrative:    EXAMINATION:  XR THORACIC SPINE AP LATERAL    CLINICAL HISTORY:  Unspecified convulsions    TECHNIQUE:  AP and lateral views of the thoracic spine were performed.    COMPARISON:  None    FINDINGS:  Thoracic spine alignment appears within normal limits.  No evidence of acute thoracic spine fracture or subluxation.  Heart is normal in size.  Visualized lungs are clear.                                       X-Ray Shoulder Trauma Right (Final result)  Result time 05/30/24 21:44:35      Final result by Zeb Cazares MD (05/30/24 21:44:35)                   Impression:      No acute osseous abnormality identified.      Electronically signed by: Zeb Cazares MD  Date:    05/30/2024  Time:    21:44               Narrative:    EXAMINATION:  XR SHOULDER TRAUMA 3 VIEW RIGHT    CLINICAL HISTORY:  Unspecified convulsions    TECHNIQUE:  Three views of the right shoulder were performed.    COMPARISON:  None    FINDINGS:  No evidence of acute displaced fracture, dislocation, or osseous destructive process.                                       CT Cervical Spine Without Contrast (Final result)  Result time 05/30/24 18:45:32      Final result by Zeb Cazares MD (05/30/24 18:45:32)                   Impression:      No evidence of acute cervical spine fracture or dislocation.      Electronically signed by: Zeb Cazares MD  Date:    05/30/2024  Time:    18:45               Narrative:    EXAMINATION:  CT CERVICAL SPINE WITHOUT CONTRAST    CLINICAL  HISTORY:  fall;    TECHNIQUE:  Low dose axial images, sagittal and coronal reformations were performed though the cervical spine.  Contrast was not administered.    COMPARISON:  CT cervical spine from February 2021.    FINDINGS:  No evidence of acute cervical spine fracture or dislocation.  Odontoid process is intact.  Craniocervical junction is unremarkable.  Cervical spine alignment is within normal limits.    Surrounding soft tissues show no significant abnormalities.  Airway is patent.  Partially visualized lung apices are clear.                                       CT Head Without Contrast (Final result)  Result time 05/30/24 18:34:25      Final result by Zeb Cazares MD (05/30/24 18:34:25)                   Impression:      No acute intracranial abnormalities identified.      Electronically signed by: Zeb Cazares MD  Date:    05/30/2024  Time:    18:34               Narrative:    EXAMINATION:  CT HEAD WITHOUT CONTRAST    CLINICAL HISTORY:  fall;    TECHNIQUE:  Low dose axial images were obtained through the head.  Coronal and sagittal reformations were also performed. Contrast was not administered.    COMPARISON:  CT head from February 2021.    FINDINGS:  No evidence of acute/recent major vascular distribution cerebral infarction, intraparenchymal hemorrhage, or intra-axial space occupying lesion. The ventricular system is normal in size and configuration with no evidence of hydrocephalus. No effacement of the skull-base cisterns. No abnormal extra-axial fluid collections or blood products. Visualized paranasal sinuses and mastoid air cells are clear. The calvarium shows no significant abnormality.                                       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     Pt last seen here on 11/3/2023 for a URI. Flu and COVID negative. Rx naproxen for headache. Discharged home in stable condition.     Amount and/or Complexity of Data Reviewed  External Data Reviewed: ECG and notes.     Details: External documents reviewed for previous EKG comparison: see ED course.    I decided to obtain old medical records; see [facility].   Labs: ordered. Decision-making details documented in ED Course.  Radiology: ordered. Decision-making details documented in ED Course.  ECG/medicine tests: ordered and independent interpretation performed. Decision-making details documented in ED Course.     Details: EKGs independently interpreted by Dr. Baldwin reads: see ED course. External documents reviewed for previous EKG comparison: see ED course.       Risk  Prescription drug management.    [facility]  patient with PMH significant for seizure disorder presented to ED today following a seizure.    Ddx includes but is not limited to seizure, rhabdo, occult fracture, strain, sprain, metabolic derangement, medication nonadherence.     Pt is (not) compliant with AED therapy. Supposed to be taking depakote 2500 mg qday.   Workup today included labs, urine, CT, xray which was unremarkable.    UA with trace protein, no infection.   Hgb 13.3. no leukocytosis.   No metabolic derangement  , IVF given  Valproate undetectable.   CT head and cervical spine NAF  Xr NAF      Discussed with Dr. Walter with neurology who recommends giving 2500 mg of depakote today and then continuing on his home medication.     I have loaded the patient on appropriate AED treatment.    Pt was observed in the ED for several hours with no recurrent seizure episodes. Back to neurologic baseline, stable steady gait. Advised on importance of adherence with medical regimen, given referral  to neurology for close followup and advised no driving [place] law. Pt verbalized their understanding of the treatment plan and this law back to me. They were advised to return to the ED for any new or worsening symptoms. They were given the opportunity to ask questions, which were reasonably addressed to the best of my ability and their apparent satisfaction.          Scribe Attestation:   Scribe #1: I performed the above scribed service and the documentation accurately describes the services I performed. I attest to the accuracy of the note.        ED Course as of 05/31/24 0601   Thu May 30, 2024   2133 EKG 12-lead    Normal sinus rhythm with sinus arrhythmia at 67.  No ST elevation or significant depression.  The patient does have some baseline movement on this EKG.  T-wave inversions in V1.   Rightward axis deviation.  QTC is 382.  This EKG is similar to his previous from February 20, 2021 although the right axis deviation is present today. [JT]      ED Course User Index  [JT] Alecia Baldwin MD I, Julia Terle, personally performed the services described in this documentation.  All medical record entries made by the scribe were at my direction and in my presence.  I have reviewed the chart and agree that the record reflects my personal performance and is accurate and complete.    Clinical Impression:  Final diagnoses:  [R56.9] Seizure  [M54.9] Back pain, unspecified back location, unspecified back pain laterality, unspecified chronicity (Primary)          ED Disposition Condition    Discharge Stable          ED Prescriptions       Medication Sig Dispense Start Date End Date Auth. Provider    naproxen (NAPROSYN) 500 MG tablet Take 1 tablet (500 mg total) by mouth 2 (two) times daily with meals. As needed for pain 20 tablet 5/31/2024 -- Alecia Baldwin MD    methocarbamoL (ROBAXIN) 500 MG Tab Take 2 tablets (1,000 mg total) by mouth 3 (three) times daily. As needed for muscle relaxation for 5  days 30 tablet 5/31/2024 6/5/2024 Alecia Baldwin MD    LIDOcaine (LIDODERM) 5 % Place 1 patch onto the skin daily as needed (pain). Remove & Discard patch within 12 hours, apply only to intact skin. 10 patch 5/31/2024 6/10/2024 Alecia Baldwin MD          Follow-up Information       Follow up With Specialties Details Why Contact Info Additional Information    Shayna Dubose MD Internal Medicine, Wound Care Schedule an appointment as soon as possible for a visit in 2 days to discuss recent ED visit, to establish primary doctor [address] [place] [address]  [address]  [place] [id number]  [phone]       [facility] Emergency Dept Emergency Medicine  As needed, If symptoms worsen [address] [facility] [place] [address] [phone]     [facility] Neurology Neurology Schedule an appointment as soon as possible for a visit in 2 days to discuss recent ED visit [address]  [address]  [facility] [address] [phone] Please park in garage or Medical [facility] Bldg surface lot and use Medical Office Bldg elevator. Check in at [address].              Alecia Baldwin MD  05/31/24 0605

## 2024-05-31 NOTE — DISCHARGE INSTRUCTIONS
Do not drink alcohol, drive any sort of motorized vehicle or boat, work at heights, or operate machinery, take a bath or swim alone, monitor children near water or dangerous situations alone, cook over an open flame, hold small children or infants, and avoid heights or other areas where falls may occur or any other dangerous activities until you have been cleared by either a primary care physician or neurologist. A seizure during these or other activities could cause serious injury or death. Take all medication as directed.    Thank you for coming to our Emergency Department today. As we discussed, it is important to remember that some problems are difficult to diagnose and may not be found during your Emergency Department visit. Be sure to follow up with your primary care doctor and review all labs/imaging/tests that were performed during this visit with them. Some labs/tests may be outside of the normal range and require non-emergent follow-up and further investigation to help diagnose/exclude/prevent complications or other medical conditions.    If you do not have a primary care doctor, you may contact the one listed on your discharge paperwork or you may also call the [facility] Appointment Desk at [phone] to schedule an appointment and establish care with one. It is important to your health that you have a primary care doctor.    Please take all medications as directed. All medications may potentially have side-effects and it is impossible to predict which medications may give you side-effects or what side-effects (if any) they will give you.. If you feel that you are having a negative effect or side-effect of any medication you should immediately stop taking them and seek medical attention. If you feel that you are having a life-threatening reaction call 911.    Return to the ER with any questions/concerns, new/concerning symptoms, worsening or failure to improve.     Do not drive, swim, climb to  height, take a bath or make any important decisions for 24 hours if you have received any pain medications, sedatives or mood altering drugs during your ER visit.

## 2024-05-31 NOTE — ED NOTES
Patient here due to seizure earlier this afternoon. Patient does not recall events. States his grandpa witnessed seizure. Grandfather is NOT at bedside. Patient states he fell and hit his back and shoulder. Right shoulder, back and neck are feeling sore per patient report. Medium sized not approx 4-5 cm in diameter on the back of right shoulder. Patient is AAO x 4 at this time. Seizure precautions in place.

## 2024-06-01 LAB
OHS QRS DURATION: 94 MS
OHS QTC CALCULATION: 382 MS

## 2024-12-19 ENCOUNTER — TELEPHONE (OUTPATIENT)
Dept: NEUROLOGY | Facility: CLINIC | Age: 24
End: 2024-12-19
Payer: MEDICAID

## 2024-12-19 ENCOUNTER — TELEPHONE (OUTPATIENT)
Dept: FAMILY MEDICINE | Facility: CLINIC | Age: 24
End: 2024-12-19
Payer: MEDICAID

## 2024-12-19 DIAGNOSIS — G40.909 SEIZURE DISORDER: Primary | ICD-10-CM

## 2024-12-19 RX ORDER — DIVALPROEX SODIUM 500 MG/1
2500 TABLET, FILM COATED, EXTENDED RELEASE ORAL DAILY
Qty: 450 TABLET | Refills: 1 | Status: SHIPPED | OUTPATIENT
Start: 2024-12-19 | End: 2025-06-17

## 2024-12-19 NOTE — TELEPHONE ENCOUNTER
Shaunna Lopez reached out requesting the pt be scheduled with epileptologist. Pt previously est'd with Charles Woodruff but has not seen her since 2022. Pt is out of meds x 2 weeks. Reached out to  Navigator for assistance, pt scheduled for Monday with DONOVAN REYNOSO for [facility]V, pt and mother made aware of appt date and time.

## 2024-12-20 NOTE — TELEPHONE ENCOUNTER
----- Message from Selin Schmitt MD sent at 12/19/2024  1:53 PM CST -----  Please schedule pt appt with dr grigsby or dr miller whoever has first avaible new patient opening. Thanks